# Patient Record
Sex: MALE | Race: WHITE | Employment: OTHER | URBAN - METROPOLITAN AREA
[De-identification: names, ages, dates, MRNs, and addresses within clinical notes are randomized per-mention and may not be internally consistent; named-entity substitution may affect disease eponyms.]

---

## 2017-01-26 ENCOUNTER — TELEPHONE (OUTPATIENT)
Dept: INTERNAL MEDICINE CLINIC | Age: 82
End: 2017-01-26

## 2017-05-25 ENCOUNTER — OFFICE VISIT (OUTPATIENT)
Dept: INTERNAL MEDICINE CLINIC | Age: 82
End: 2017-05-25

## 2017-05-25 VITALS
HEIGHT: 72 IN | HEART RATE: 65 BPM | OXYGEN SATURATION: 97 % | DIASTOLIC BLOOD PRESSURE: 70 MMHG | TEMPERATURE: 98.8 F | SYSTOLIC BLOOD PRESSURE: 136 MMHG | WEIGHT: 208 LBS | RESPIRATION RATE: 14 BRPM | BODY MASS INDEX: 28.17 KG/M2

## 2017-05-25 DIAGNOSIS — I48.20 CHRONIC ATRIAL FIBRILLATION (HCC): ICD-10-CM

## 2017-05-25 DIAGNOSIS — I10 ESSENTIAL HYPERTENSION: Primary | ICD-10-CM

## 2017-05-25 DIAGNOSIS — Z80.0 FH: COLON CANCER: ICD-10-CM

## 2017-05-25 DIAGNOSIS — R60.9 EDEMA, UNSPECIFIED TYPE: ICD-10-CM

## 2017-05-25 PROCEDURE — 99214 OFFICE O/P EST MOD 30 MIN: CPT | Performed by: INTERNAL MEDICINE

## 2017-05-25 RX ORDER — FUROSEMIDE 40 MG/1
40 TABLET ORAL DAILY
Qty: 90 TABLET | Refills: 1 | Status: SHIPPED | OUTPATIENT
Start: 2017-05-25 | End: 2017-06-06 | Stop reason: SDUPTHER

## 2017-05-25 ASSESSMENT — ENCOUNTER SYMPTOMS
VOICE CHANGE: 0
SHORTNESS OF BREATH: 0
WHEEZING: 0
TROUBLE SWALLOWING: 0
ABDOMINAL PAIN: 0
CONSTIPATION: 0

## 2017-06-06 RX ORDER — FUROSEMIDE 40 MG/1
40 TABLET ORAL DAILY
Qty: 90 TABLET | Refills: 1 | Status: SHIPPED | OUTPATIENT
Start: 2017-06-06 | End: 2017-11-27 | Stop reason: SDUPTHER

## 2017-06-13 ENCOUNTER — TELEPHONE (OUTPATIENT)
Dept: INTERNAL MEDICINE CLINIC | Age: 82
End: 2017-06-13

## 2017-07-25 ENCOUNTER — OFFICE VISIT (OUTPATIENT)
Dept: INTERNAL MEDICINE CLINIC | Age: 82
End: 2017-07-25

## 2017-07-25 VITALS
SYSTOLIC BLOOD PRESSURE: 118 MMHG | HEART RATE: 60 BPM | OXYGEN SATURATION: 97 % | HEIGHT: 72 IN | BODY MASS INDEX: 27.77 KG/M2 | DIASTOLIC BLOOD PRESSURE: 64 MMHG | WEIGHT: 205 LBS

## 2017-07-25 DIAGNOSIS — E53.8 B12 DEFICIENCY: ICD-10-CM

## 2017-07-25 DIAGNOSIS — R73.9 HYPERGLYCEMIA: Primary | ICD-10-CM

## 2017-07-25 DIAGNOSIS — I25.10 CORONARY ARTERY DISEASE INVOLVING NATIVE HEART WITHOUT ANGINA PECTORIS, UNSPECIFIED VESSEL OR LESION TYPE: ICD-10-CM

## 2017-07-25 DIAGNOSIS — I10 ESSENTIAL HYPERTENSION: Primary | ICD-10-CM

## 2017-07-25 DIAGNOSIS — Z80.0 FH: COLON CANCER: ICD-10-CM

## 2017-07-25 DIAGNOSIS — I48.20 CHRONIC ATRIAL FIBRILLATION (HCC): ICD-10-CM

## 2017-07-25 PROCEDURE — 99214 OFFICE O/P EST MOD 30 MIN: CPT | Performed by: INTERNAL MEDICINE

## 2017-07-25 RX ORDER — POTASSIUM CHLORIDE 20 MEQ/1
20 TABLET, EXTENDED RELEASE ORAL DAILY
Qty: 90 TABLET | Refills: 1 | Status: SHIPPED | OUTPATIENT
Start: 2017-07-25 | End: 2017-11-27 | Stop reason: SDUPTHER

## 2017-07-25 RX ORDER — WARFARIN SODIUM 5 MG/1
7.5 TABLET ORAL DAILY
Qty: 135 TABLET | Refills: 1 | Status: SHIPPED | OUTPATIENT
Start: 2017-07-25 | End: 2017-11-27 | Stop reason: SDUPTHER

## 2017-07-25 ASSESSMENT — ENCOUNTER SYMPTOMS
DIARRHEA: 0
SHORTNESS OF BREATH: 0
WHEEZING: 0
ABDOMINAL PAIN: 0
CONSTIPATION: 1
COUGH: 0
SORE THROAT: 0

## 2017-07-28 LAB
A/G RATIO: 1.4 (ref 1.1–2.2)
ALBUMIN SERPL-MCNC: 4.2 G/DL (ref 3.4–5)
ALP BLD-CCNC: 66 U/L (ref 40–129)
ALT SERPL-CCNC: 17 U/L (ref 10–40)
ANION GAP SERPL CALCULATED.3IONS-SCNC: 15 MMOL/L (ref 3–16)
AST SERPL-CCNC: 27 U/L (ref 15–37)
BILIRUB SERPL-MCNC: 0.9 MG/DL (ref 0–1)
BUN BLDV-MCNC: 25 MG/DL (ref 7–20)
CALCIUM SERPL-MCNC: 9.8 MG/DL (ref 8.3–10.6)
CHLORIDE BLD-SCNC: 100 MMOL/L (ref 99–110)
CHOLESTEROL, TOTAL: 187 MG/DL (ref 0–199)
CO2: 29 MMOL/L (ref 21–32)
CREAT SERPL-MCNC: 1.2 MG/DL (ref 0.8–1.3)
GFR AFRICAN AMERICAN: >60
GFR NON-AFRICAN AMERICAN: 57
GLOBULIN: 2.9 G/DL
GLUCOSE BLD-MCNC: 116 MG/DL (ref 70–99)
HDLC SERPL-MCNC: 46 MG/DL (ref 40–60)
LDL CHOLESTEROL CALCULATED: 99 MG/DL
POTASSIUM SERPL-SCNC: 4.3 MMOL/L (ref 3.5–5.1)
SODIUM BLD-SCNC: 144 MMOL/L (ref 136–145)
TOTAL PROTEIN: 7.1 G/DL (ref 6.4–8.2)
TRIGL SERPL-MCNC: 212 MG/DL (ref 0–150)
VLDLC SERPL CALC-MCNC: 42 MG/DL

## 2017-07-29 LAB
ESTIMATED AVERAGE GLUCOSE: 134.1 MG/DL
HBA1C MFR BLD: 6.3 %
VITAMIN B-12: 1053 PG/ML (ref 211–911)

## 2017-08-16 ENCOUNTER — OFFICE VISIT (OUTPATIENT)
Dept: INTERNAL MEDICINE CLINIC | Age: 82
End: 2017-08-16

## 2017-08-16 VITALS
HEART RATE: 66 BPM | DIASTOLIC BLOOD PRESSURE: 72 MMHG | BODY MASS INDEX: 28.17 KG/M2 | OXYGEN SATURATION: 95 % | RESPIRATION RATE: 14 BRPM | WEIGHT: 208 LBS | HEIGHT: 72 IN | TEMPERATURE: 98.1 F | SYSTOLIC BLOOD PRESSURE: 138 MMHG

## 2017-08-16 DIAGNOSIS — I48.20 CHRONIC ATRIAL FIBRILLATION (HCC): ICD-10-CM

## 2017-08-16 DIAGNOSIS — I10 ESSENTIAL HYPERTENSION: ICD-10-CM

## 2017-08-16 DIAGNOSIS — G45.3 AMAUROSIS FUGAX OF LEFT EYE: Primary | ICD-10-CM

## 2017-08-16 PROCEDURE — 99214 OFFICE O/P EST MOD 30 MIN: CPT | Performed by: INTERNAL MEDICINE

## 2017-08-16 RX ORDER — HYDROCORTISONE ACETATE 0.5 %
CREAM (GRAM) TOPICAL
Refills: 0 | COMMUNITY
Start: 2017-08-16 | End: 2019-10-29 | Stop reason: ALTCHOICE

## 2017-08-16 ASSESSMENT — ENCOUNTER SYMPTOMS
VOMITING: 0
EYE PAIN: 0
NAUSEA: 0
COUGH: 0
SHORTNESS OF BREATH: 0

## 2017-08-25 ENCOUNTER — HOSPITAL ENCOUNTER (OUTPATIENT)
Dept: NON INVASIVE DIAGNOSTICS | Age: 82
Discharge: HOME | End: 2017-08-25
Attending: INTERNAL MEDICINE | Admitting: INTERNAL MEDICINE

## 2017-08-25 ENCOUNTER — HOSPITAL ENCOUNTER (OUTPATIENT)
Dept: VASCULAR LAB | Age: 82
Discharge: HOME | End: 2017-08-25
Attending: INTERNAL MEDICINE | Admitting: INTERNAL MEDICINE

## 2017-08-25 DIAGNOSIS — G45.3 AMAUROSIS FUGAX: ICD-10-CM

## 2017-08-25 LAB
LEFT VENTRICULAR EJECTION FRACTION HIGH VALUE: 60 %
LEFT VENTRICULAR EJECTION FRACTION MODE: NORMAL
LV EF: 60 %
LVEF MODALITY: NORMAL

## 2017-08-29 ENCOUNTER — TELEPHONE (OUTPATIENT)
Dept: INTERNAL MEDICINE CLINIC | Age: 82
End: 2017-08-29

## 2017-09-06 ENCOUNTER — TELEPHONE (OUTPATIENT)
Dept: INTERNAL MEDICINE CLINIC | Age: 82
End: 2017-09-06

## 2017-09-08 ENCOUNTER — TELEPHONE (OUTPATIENT)
Dept: INTERNAL MEDICINE CLINIC | Age: 82
End: 2017-09-08

## 2017-09-08 NOTE — TELEPHONE ENCOUNTER
[facility] is calling about the monitor for Pt/INR.  They are asking if patient is being monitored here   [phone] [phone]

## 2017-09-08 NOTE — TELEPHONE ENCOUNTER
Attempted to reach [facility], voicemail was full  To answer the question, yes Dr. Carlos Diana is monitoring PT/INR

## 2017-10-04 ENCOUNTER — TELEPHONE (OUTPATIENT)
Dept: INTERNAL MEDICINE CLINIC | Age: 82
End: 2017-10-04

## 2017-10-16 ENCOUNTER — OFFICE VISIT (OUTPATIENT)
Dept: INTERNAL MEDICINE CLINIC | Age: 82
End: 2017-10-16

## 2017-10-16 VITALS
RESPIRATION RATE: 14 BRPM | DIASTOLIC BLOOD PRESSURE: 64 MMHG | SYSTOLIC BLOOD PRESSURE: 132 MMHG | BODY MASS INDEX: 27.93 KG/M2 | HEIGHT: 72 IN | OXYGEN SATURATION: 96 % | HEART RATE: 78 BPM | WEIGHT: 206.2 LBS | TEMPERATURE: 97.5 F

## 2017-10-16 DIAGNOSIS — E53.8 B12 DEFICIENCY: ICD-10-CM

## 2017-10-16 DIAGNOSIS — R73.9 HYPERGLYCEMIA: ICD-10-CM

## 2017-10-16 DIAGNOSIS — I48.20 CHRONIC ATRIAL FIBRILLATION (HCC): Primary | ICD-10-CM

## 2017-10-16 DIAGNOSIS — I10 ESSENTIAL HYPERTENSION: ICD-10-CM

## 2017-10-16 LAB
INR BLD: 2.48 (ref 0.85–1.15)
PROTHROMBIN TIME: 28 SEC (ref 9.6–13)

## 2017-10-16 PROCEDURE — 99214 OFFICE O/P EST MOD 30 MIN: CPT | Performed by: INTERNAL MEDICINE

## 2017-10-16 ASSESSMENT — ENCOUNTER SYMPTOMS
ABDOMINAL PAIN: 0
COUGH: 0
WHEEZING: 0
SHORTNESS OF BREATH: 0
CONSTIPATION: 0
DIARRHEA: 0

## 2017-10-24 ENCOUNTER — HOSPITAL ENCOUNTER (OUTPATIENT)
Dept: NON INVASIVE DIAGNOSTICS | Age: 82
Discharge: HOME | End: 2017-10-24
Attending: INTERNAL MEDICINE | Admitting: INTERNAL MEDICINE

## 2017-10-24 ENCOUNTER — PATIENT MESSAGE (OUTPATIENT)
Dept: INTERNAL MEDICINE CLINIC | Age: 82
End: 2017-10-24

## 2017-10-24 DIAGNOSIS — I48.20 CHRONIC ATRIAL FIBRILLATION (HCC): ICD-10-CM

## 2017-10-24 LAB
LV EF: 55 %
LVEF MODALITY: NORMAL

## 2017-10-26 ENCOUNTER — TELEPHONE (OUTPATIENT)
Dept: INTERNAL MEDICINE CLINIC | Age: 82
End: 2017-10-26

## 2017-10-26 ENCOUNTER — ANTI-COAG VISIT (OUTPATIENT)
Dept: INTERNAL MEDICINE CLINIC | Age: 82
End: 2017-10-26

## 2017-10-26 DIAGNOSIS — I48.19 PERSISTENT ATRIAL FIBRILLATION (HCC): ICD-10-CM

## 2017-10-26 LAB — INR BLD: 1.3

## 2017-10-26 NOTE — PROGRESS NOTES
[phone] (home) [phone] (work)  Pt notified of new dosage and retest date, pt repeated directions and verbalized understanding

## 2017-10-26 NOTE — TELEPHONE ENCOUNTER
INR routed as a separate message:    [phone] (home) [phone] (work)    Called patient-is wanting results of result Echo results.   Results printed and left at

## 2017-11-20 ENCOUNTER — TELEPHONE (OUTPATIENT)
Dept: INTERNAL MEDICINE CLINIC | Age: 82
End: 2017-11-20

## 2017-11-27 ENCOUNTER — OFFICE VISIT (OUTPATIENT)
Dept: INTERNAL MEDICINE CLINIC | Age: 82
End: 2017-11-27

## 2017-11-27 ENCOUNTER — ANTI-COAG VISIT (OUTPATIENT)
Dept: INTERNAL MEDICINE CLINIC | Age: 82
End: 2017-11-27

## 2017-11-27 VITALS
DIASTOLIC BLOOD PRESSURE: 72 MMHG | BODY MASS INDEX: 27.59 KG/M2 | HEART RATE: 50 BPM | TEMPERATURE: 97.5 F | WEIGHT: 203.7 LBS | HEIGHT: 72 IN | RESPIRATION RATE: 12 BRPM | OXYGEN SATURATION: 96 % | SYSTOLIC BLOOD PRESSURE: 122 MMHG

## 2017-11-27 DIAGNOSIS — R25.1 TREMOR: ICD-10-CM

## 2017-11-27 DIAGNOSIS — Z00.00 ANNUAL PHYSICAL EXAM: Primary | ICD-10-CM

## 2017-11-27 DIAGNOSIS — I10 ESSENTIAL HYPERTENSION: ICD-10-CM

## 2017-11-27 DIAGNOSIS — I48.20 CHRONIC ATRIAL FIBRILLATION (HCC): ICD-10-CM

## 2017-11-27 DIAGNOSIS — C61 PROSTATE CANCER (HCC): ICD-10-CM

## 2017-11-27 DIAGNOSIS — R73.9 HYPERGLYCEMIA: ICD-10-CM

## 2017-11-27 LAB
A/G RATIO: 1.4 (ref 1.1–2.2)
ALBUMIN SERPL-MCNC: 4.2 G/DL (ref 3.4–5)
ALP BLD-CCNC: 68 U/L (ref 40–129)
ALT SERPL-CCNC: 15 U/L (ref 10–40)
ANION GAP SERPL CALCULATED.3IONS-SCNC: 14 MMOL/L (ref 3–16)
AST SERPL-CCNC: 19 U/L (ref 15–37)
BILIRUB SERPL-MCNC: 1 MG/DL (ref 0–1)
BILIRUBIN, POC: NORMAL
BLOOD URINE, POC: NORMAL
BUN BLDV-MCNC: 44 MG/DL (ref 7–20)
CALCIUM SERPL-MCNC: 10.1 MG/DL (ref 8.3–10.6)
CHLORIDE BLD-SCNC: 99 MMOL/L (ref 99–110)
CHOLESTEROL, TOTAL: 184 MG/DL (ref 0–199)
CLARITY, POC: CLEAR
CO2: 30 MMOL/L (ref 21–32)
COLOR, POC: YELLOW
CREAT SERPL-MCNC: 1.5 MG/DL (ref 0.8–1.3)
ESTIMATED AVERAGE GLUCOSE: 139.9 MG/DL
GFR AFRICAN AMERICAN: 53
GFR NON-AFRICAN AMERICAN: 44
GLOBULIN: 3.1 G/DL
GLUCOSE BLD-MCNC: 111 MG/DL (ref 70–99)
GLUCOSE URINE, POC: NORMAL
HBA1C MFR BLD: 6.5 %
HDLC SERPL-MCNC: 49 MG/DL (ref 40–60)
INR BLD: 2.58 (ref 0.85–1.15)
KETONES, POC: NORMAL
LDL CHOLESTEROL CALCULATED: 96 MG/DL
LEUKOCYTE EST, POC: NORMAL
NITRITE, POC: NORMAL
PH, POC: 6
POTASSIUM SERPL-SCNC: 4 MMOL/L (ref 3.5–5.1)
PROTEIN, POC: NORMAL
PROTHROMBIN TIME: 29.1 SEC (ref 9.6–13)
SODIUM BLD-SCNC: 143 MMOL/L (ref 136–145)
SPECIFIC GRAVITY, POC: 1.02
TOTAL PROTEIN: 7.3 G/DL (ref 6.4–8.2)
TRIGL SERPL-MCNC: 196 MG/DL (ref 0–150)
TSH SERPL DL<=0.05 MIU/L-ACNC: 1.74 UIU/ML (ref 0.27–4.2)
UROBILINOGEN, POC: 0.2
VLDLC SERPL CALC-MCNC: 39 MG/DL

## 2017-11-27 PROCEDURE — 99397 PER PM REEVAL EST PAT 65+ YR: CPT | Performed by: INTERNAL MEDICINE

## 2017-11-27 PROCEDURE — 81002 URINALYSIS NONAUTO W/O SCOPE: CPT | Performed by: INTERNAL MEDICINE

## 2017-11-27 RX ORDER — WARFARIN SODIUM 5 MG/1
7.5 TABLET ORAL DAILY
Qty: 135 TABLET | Refills: 1 | Status: SHIPPED | OUTPATIENT
Start: 2017-11-27 | End: 2018-11-29 | Stop reason: SDUPTHER

## 2017-11-27 RX ORDER — POTASSIUM CHLORIDE 20 MEQ/1
20 TABLET, EXTENDED RELEASE ORAL DAILY
Qty: 90 TABLET | Refills: 1 | Status: SHIPPED | OUTPATIENT
Start: 2017-11-27 | End: 2018-11-29 | Stop reason: SDUPTHER

## 2017-11-27 RX ORDER — FUROSEMIDE 40 MG/1
40 TABLET ORAL DAILY
Qty: 90 TABLET | Refills: 1 | Status: SHIPPED | OUTPATIENT
Start: 2017-11-27 | End: 2018-05-29 | Stop reason: SDUPTHER

## 2017-11-27 RX ORDER — RESVERA/CHROM/GR.TEA/EGCG/DIG3 50MG-20MCG
CAPSULE ORAL
COMMUNITY
End: 2018-11-29 | Stop reason: ALTCHOICE

## 2017-11-27 ASSESSMENT — ENCOUNTER SYMPTOMS
DIARRHEA: 0
APNEA: 0
EYE PAIN: 0
TROUBLE SWALLOWING: 0
ABDOMINAL PAIN: 0
VOMITING: 0
WHEEZING: 0
COLOR CHANGE: 0
BACK PAIN: 0
ABDOMINAL DISTENTION: 0
SINUS PRESSURE: 0
COUGH: 0
CONSTIPATION: 0
CHEST TIGHTNESS: 0
SHORTNESS OF BREATH: 0
VOICE CHANGE: 0
RHINORRHEA: 0
NAUSEA: 0

## 2017-11-27 NOTE — PROGRESS NOTES
Subjective:      Patient ID: Malik Walsh is a 80 y.o. male. HPI     Chief Complaint   Patient presents with    Hyperlipidemia     Fasting     Hypertension    Annual Exam      Saw Dr Mario Alberto Hawk (), psa was undetectable 7-6-17, brought in result    Weight steady    Fasting for blood work    Feels good \"wonderful\"  occ staggers when rises, discussed, not light headed  No falls/injuries    Echocardiogram discussed    Didn't do the CT colonoscopy - laxative wasn't effective, etc        Current Outpatient Prescriptions   Medication Sig Dispense Refill    Misc Natural Products (RESVERATROL DIET) CAPS Take by mouth      Glucosamine-Chondroit-Vit C-Mn (GLUCOSAMINE 1500 COMPLEX) CAPS Take by mouth  0    Aliskiren-Hydrochlorothiazide (TEKTURNA HCT) 300-12.5 MG TABS Take 1 tablet by mouth daily 90 tablet 1    warfarin (COUMADIN) 5 MG tablet Take 1.5 tablets by mouth daily 135 tablet 1    potassium chloride (KLOR-CON M) 20 MEQ extended release tablet Take 1 tablet by mouth daily 90 tablet 1    furosemide (LASIX) 40 MG tablet Take 1 tablet by mouth daily 90 tablet 1    ketoconazole (NIZORAL) 2 % shampoo Apply topically daily as needed for Itching Apply topically daily as needed.  magnesium oxide (MAG-) 400 MG tablet Take 1 tablet by mouth 2 times daily. 180 tablet 3    esomeprazole (NEXIUM) 40 MG capsule Take 1 capsule by mouth every morning (before breakfast). 90 capsule 3    Cyanocobalamin (VITAMIN B-12) 2000 MCG TBCR Take 1 tablet by mouth daily.  betamethasone dipropionate (DIPROLENE) 0.05 % cream       Acetaminophen (TYLENOL ARTHRITIS EXT RELIEF PO) Take  by mouth.  Multiple Vitamins-Minerals (CENTRUM SILVER PO) Take 1 tablet by mouth daily.  azelastine (ASTELIN) 0.1 % nasal spray 2 sprays by Nasal route 2 times daily as needed for Rhinitis Use in each nostril as directed 1 Bottle 0     No current facility-administered medications for this visit. Allergies:   Allergies Oropharynx is clear and moist.   Eyes: Conjunctivae and EOM are normal. Pupils are equal, round, and reactive to light. Neck: Normal range of motion. Neck supple. No JVD present. No thyromegaly present. Cardiovascular: Normal rate and intact distal pulses. As/mr   irreg irreg   Pulmonary/Chest: Effort normal and breath sounds normal.   Abdominal: Soft. Bowel sounds are normal. He exhibits no distension and no mass. There is no tenderness. There is no rebound and no guarding. Musculoskeletal: He exhibits deformity (thoracic kyphosis and some cervical ankylosis). He exhibits no edema or tenderness. Reduced rom of both shoulders  S/p knee tkr   Lymphadenopathy:     He has no cervical adenopathy. He has no axillary adenopathy. Right: No inguinal adenopathy present. Left: No inguinal adenopathy present. Neurological: He is alert and oriented to person, place, and time. He has normal reflexes. Tremor head and hands   Skin: Skin is warm and dry. No rash noted. No erythema. Psychiatric: He has a normal mood and affect. His behavior is normal. Judgment and thought content normal.   Vitals reviewed. Assessment:        ICD-10-CM ICD-9-CM    1. Annual physical exam Z00.00 V70.0 POCT Urinalysis no Micro      Lipid Panel      Comprehensive Metabolic Panel      Protime-INR      Hemoglobin A1C      TSH without Reflex   2. Essential hypertension I10 401.9 POCT Urinalysis no Micro      Comprehensive Metabolic Panel      TSH without Reflex   3. Chronic atrial fibrillation (HCC) I48.2 427.31 TSH without Reflex   4. Hyperglycemia R73.9 790.29 Hemoglobin A1C   5. Tremor R25.1 781.0 TSH without Reflex   6. Prostate cancer ([facility] [address] C61 [id number]           Plan:      Annual exam - ok for age - ortho limitations are noted    htn - The current medical regimen is effective;  continue present plan and medications.      CAF - monitors at home - protime here for quality check    Glucose - check a1c    Tremor - no

## 2017-11-28 ENCOUNTER — TELEPHONE (OUTPATIENT)
Dept: INTERNAL MEDICINE CLINIC | Age: 82
End: 2017-11-28

## 2017-11-28 NOTE — TELEPHONE ENCOUNTER
Patient called to request the patch to put behind his ear for an upcoming 5 day Cruise. Would like for Rx to be called into [facility]  [phone].     Also states his wife was given a vaccine for pneumonia, and wanted to know if Dr. Josie De Leon feels he needs to get one

## 2017-12-08 ENCOUNTER — ANTI-COAG VISIT (OUTPATIENT)
Dept: INTERNAL MEDICINE CLINIC | Age: 82
End: 2017-12-08

## 2017-12-08 ENCOUNTER — TELEPHONE (OUTPATIENT)
Dept: INTERNAL MEDICINE CLINIC | Age: 82
End: 2017-12-08

## 2017-12-08 DIAGNOSIS — I48.20 CHRONIC ATRIAL FIBRILLATION (HCC): ICD-10-CM

## 2017-12-08 LAB — INR BLD: 3

## 2017-12-08 RX ORDER — SCOLOPAMINE TRANSDERMAL SYSTEM 1 MG/1
1 PATCH, EXTENDED RELEASE TRANSDERMAL
Qty: 3 PATCH | Refills: 0 | Status: SHIPPED | OUTPATIENT
Start: 2017-12-08 | End: 2018-11-29 | Stop reason: ALTCHOICE

## 2017-12-08 NOTE — TELEPHONE ENCOUNTER
This should have been sent as an anticoag note. He can continue his current dose of coumadin and should check another INR in one week, sooner if needed. I need to know how long the cruise is before I can send in a prescription. He needs to read the instructions and the precautions on the patches before using them.

## 2017-12-21 ENCOUNTER — ANTI-COAG VISIT (OUTPATIENT)
Dept: INTERNAL MEDICINE CLINIC | Age: 82
End: 2017-12-21

## 2017-12-21 ENCOUNTER — TELEPHONE (OUTPATIENT)
Dept: INTERNAL MEDICINE CLINIC | Age: 82
End: 2017-12-21

## 2017-12-21 DIAGNOSIS — I48.20 CHRONIC ATRIAL FIBRILLATION (HCC): ICD-10-CM

## 2017-12-21 LAB — INR BLD: 2.3

## 2017-12-21 NOTE — TELEPHONE ENCOUNTER
Pt states tongue did this a few weeks ago. Not much blood, just notices it here and there. He was unable to come today for appt. Scheduled him for tomorrow.

## 2017-12-26 ENCOUNTER — OFFICE VISIT (OUTPATIENT)
Dept: INTERNAL MEDICINE CLINIC | Age: 82
End: 2017-12-26

## 2017-12-26 VITALS
HEIGHT: 72 IN | OXYGEN SATURATION: 96 % | BODY MASS INDEX: 27.63 KG/M2 | HEART RATE: 68 BPM | SYSTOLIC BLOOD PRESSURE: 134 MMHG | DIASTOLIC BLOOD PRESSURE: 82 MMHG | WEIGHT: 204 LBS

## 2017-12-26 DIAGNOSIS — K14.0 GLOSSITIS: ICD-10-CM

## 2017-12-26 DIAGNOSIS — S20.211A RIB CONTUSION, RIGHT, INITIAL ENCOUNTER: Primary | ICD-10-CM

## 2017-12-26 PROCEDURE — 99214 OFFICE O/P EST MOD 30 MIN: CPT | Performed by: INTERNAL MEDICINE

## 2017-12-26 ASSESSMENT — ENCOUNTER SYMPTOMS
VOMITING: 0
ABDOMINAL PAIN: 0
NAUSEA: 0

## 2017-12-26 ASSESSMENT — PATIENT HEALTH QUESTIONNAIRE - PHQ9
1. LITTLE INTEREST OR PLEASURE IN DOING THINGS: 0
2. FEELING DOWN, DEPRESSED OR HOPELESS: 0
SUM OF ALL RESPONSES TO PHQ QUESTIONS 1-9: 0
SUM OF ALL RESPONSES TO PHQ9 QUESTIONS 1 & 2: 0

## 2017-12-26 NOTE — PROGRESS NOTES
Subjective:      Patient ID: Luis Fernando Parmar is a 80 y.o. male. HPI     Chief Complaint   Patient presents with    Other     having  bleeding  tongue     Other     fell on right  side by ribs  painful   fell last  weeks      Tripped over someone on floor, felt onto right side, hurt right chest where hit countertop, hurts with moving not with breathing  No dyspnea  5 days ago  No fever/chills  No increased cough - does hurt to cough  No sputum    Bleeding in middle of tongue, dorsum, bled a couple times  Taking a supplement for circulating, \"blood boost\" brought bottle  Ingredients reviewed, no interaction with coumadin    Review of Systems   Constitutional: Negative for appetite change, chills and fever. HENT:        See hpi   Respiratory:        See hpi   Cardiovascular: Positive for chest pain (only with palpation). Negative for leg swelling. Gastrointestinal: Negative for abdominal pain, nausea and vomiting. Endocrine: Negative for cold intolerance and heat intolerance. Musculoskeletal: Positive for arthralgias. Negative for myalgias. Hematological: Negative for adenopathy. Bruises/bleeds easily. Objective:   Physical Exam   Constitutional: He is oriented to person, place, and time. He appears well-developed and well-nourished. Neck: No tracheal deviation present. No thyromegaly present. Cardiovascular: Normal rate. irr irr   Pulmonary/Chest: Effort normal and breath sounds normal. He exhibits tenderness (right mid ax line just above bruise, no step off felt on rib). Abdominal: Soft. Bowel sounds are normal. He exhibits no distension and no mass. There is no tenderness. There is no rebound and no guarding. Musculoskeletal: He exhibits no edema. Neurological: He is alert and oriented to person, place, and time. Skin: Skin is warm and dry.    5 day old bruise right mid axillary line       Assessment:        ICD-10-CM ICD-9-CM    1. Rib contusion, right, initial encounter S20. 211A 922.1    2. Glossitis K14.0 529.0           Plan:      Rib contusion - patient leaving [facility], concerned - check cxr  Please call if symptoms worsen or do not improve.       Glossitis - no bleeding seen - no thrush - rx nystatin oral 5 ml qid side effects of the medication were discussed

## 2018-05-24 ENCOUNTER — TELEPHONE (OUTPATIENT)
Dept: INTERNAL MEDICINE CLINIC | Age: 83
End: 2018-05-24

## 2018-05-24 ENCOUNTER — ANTI-COAG VISIT (OUTPATIENT)
Dept: INTERNAL MEDICINE CLINIC | Age: 83
End: 2018-05-24

## 2018-05-24 DIAGNOSIS — I48.20 CHRONIC ATRIAL FIBRILLATION (HCC): ICD-10-CM

## 2018-05-24 LAB — INR BLD: 3.5

## 2018-05-29 ENCOUNTER — OFFICE VISIT (OUTPATIENT)
Dept: INTERNAL MEDICINE CLINIC | Age: 83
End: 2018-05-29

## 2018-05-29 ENCOUNTER — TELEPHONE (OUTPATIENT)
Dept: INTERNAL MEDICINE CLINIC | Age: 83
End: 2018-05-29

## 2018-05-29 ENCOUNTER — ANTI-COAG VISIT (OUTPATIENT)
Dept: INTERNAL MEDICINE CLINIC | Age: 83
End: 2018-05-29

## 2018-05-29 VITALS
WEIGHT: 206 LBS | HEART RATE: 64 BPM | DIASTOLIC BLOOD PRESSURE: 72 MMHG | SYSTOLIC BLOOD PRESSURE: 132 MMHG | OXYGEN SATURATION: 97 % | BODY MASS INDEX: 27.9 KG/M2 | HEIGHT: 72 IN

## 2018-05-29 DIAGNOSIS — R73.9 HYPERGLYCEMIA: ICD-10-CM

## 2018-05-29 DIAGNOSIS — I48.20 CHRONIC ATRIAL FIBRILLATION (HCC): ICD-10-CM

## 2018-05-29 DIAGNOSIS — R40.0 UNCONTROLLED DAYTIME SOMNOLENCE: Primary | ICD-10-CM

## 2018-05-29 DIAGNOSIS — E53.8 B12 DEFICIENCY: ICD-10-CM

## 2018-05-29 DIAGNOSIS — I10 ESSENTIAL HYPERTENSION: Primary | ICD-10-CM

## 2018-05-29 DIAGNOSIS — S22.41XD: ICD-10-CM

## 2018-05-29 DIAGNOSIS — I10 ESSENTIAL HYPERTENSION: ICD-10-CM

## 2018-05-29 PROBLEM — S22.49XD: Status: ACTIVE | Noted: 2018-05-29

## 2018-05-29 LAB
ANION GAP SERPL CALCULATED.3IONS-SCNC: 14 MMOL/L (ref 3–16)
BUN BLDV-MCNC: 30 MG/DL (ref 7–20)
CALCIUM SERPL-MCNC: 9.5 MG/DL (ref 8.3–10.6)
CHLORIDE BLD-SCNC: 101 MMOL/L (ref 99–110)
CO2: 29 MMOL/L (ref 21–32)
CREAT SERPL-MCNC: 1.2 MG/DL (ref 0.8–1.3)
ESTIMATED AVERAGE GLUCOSE: 137 MG/DL
GFR AFRICAN AMERICAN: >60
GFR NON-AFRICAN AMERICAN: 57
GLUCOSE BLD-MCNC: 116 MG/DL (ref 70–99)
HBA1C MFR BLD: 6.4 %
INR BLD: 3.08 (ref 0.85–1.15)
MAGNESIUM: 2 MG/DL (ref 1.8–2.4)
POTASSIUM SERPL-SCNC: 3.5 MMOL/L (ref 3.5–5.1)
PROTHROMBIN TIME: 34.8 SEC (ref 9.6–13)
SODIUM BLD-SCNC: 144 MMOL/L (ref 136–145)
VITAMIN B-12: 1161 PG/ML (ref 211–911)

## 2018-05-29 PROCEDURE — 99214 OFFICE O/P EST MOD 30 MIN: CPT | Performed by: INTERNAL MEDICINE

## 2018-05-29 RX ORDER — FUROSEMIDE 40 MG/1
40 TABLET ORAL DAILY
Qty: 90 TABLET | Refills: 1 | Status: SHIPPED | OUTPATIENT
Start: 2018-05-29 | End: 2018-11-29 | Stop reason: SDUPTHER

## 2018-05-29 RX ORDER — ESOMEPRAZOLE MAGNESIUM 40 MG/1
40 CAPSULE, DELAYED RELEASE ORAL
Qty: 90 CAPSULE | Refills: 3 | Status: SHIPPED | OUTPATIENT
Start: 2018-05-29 | End: 2018-11-29 | Stop reason: SDUPTHER

## 2018-06-25 ENCOUNTER — ANTI-COAG VISIT (OUTPATIENT)
Dept: INTERNAL MEDICINE CLINIC | Age: 83
End: 2018-06-25

## 2018-06-25 ENCOUNTER — TELEPHONE (OUTPATIENT)
Dept: INTERNAL MEDICINE CLINIC | Age: 83
End: 2018-06-25

## 2018-06-25 DIAGNOSIS — I48.20 CHRONIC ATRIAL FIBRILLATION (HCC): ICD-10-CM

## 2018-06-25 LAB — INR BLD: 3.1

## 2018-07-05 ENCOUNTER — OFFICE VISIT (OUTPATIENT)
Dept: PULMONOLOGY | Age: 83
End: 2018-07-05

## 2018-07-05 VITALS
OXYGEN SATURATION: 95 % | HEIGHT: 72 IN | HEART RATE: 63 BPM | WEIGHT: 206 LBS | DIASTOLIC BLOOD PRESSURE: 68 MMHG | BODY MASS INDEX: 27.9 KG/M2 | SYSTOLIC BLOOD PRESSURE: 127 MMHG

## 2018-07-05 DIAGNOSIS — G47.33 OBSTRUCTIVE SLEEP APNEA SYNDROME: Primary | ICD-10-CM

## 2018-07-05 DIAGNOSIS — K21.00 REFLUX ESOPHAGITIS: ICD-10-CM

## 2018-07-05 DIAGNOSIS — I10 ESSENTIAL HYPERTENSION: ICD-10-CM

## 2018-07-05 DIAGNOSIS — I25.10 CORONARY ARTERY DISEASE INVOLVING NATIVE HEART WITHOUT ANGINA PECTORIS, UNSPECIFIED VESSEL OR LESION TYPE: ICD-10-CM

## 2018-07-05 DIAGNOSIS — I48.20 CHRONIC ATRIAL FIBRILLATION (HCC): ICD-10-CM

## 2018-07-05 PROCEDURE — 99204 OFFICE O/P NEW MOD 45 MIN: CPT | Performed by: INTERNAL MEDICINE

## 2018-07-05 ASSESSMENT — ENCOUNTER SYMPTOMS
CHOKING: 0
EYE PAIN: 0
CHEST TIGHTNESS: 0
RHINORRHEA: 0
APNEA: 1
ABDOMINAL DISTENTION: 0
NAUSEA: 0
VOMITING: 0
PHOTOPHOBIA: 0
SHORTNESS OF BREATH: 0
ABDOMINAL PAIN: 0
ALLERGIC/IMMUNOLOGIC NEGATIVE: 1

## 2018-07-05 ASSESSMENT — SLEEP AND FATIGUE QUESTIONNAIRES
HOW LIKELY ARE YOU TO NOD OFF OR FALL ASLEEP WHILE WATCHING TV: 3
HOW LIKELY ARE YOU TO NOD OFF OR FALL ASLEEP IN A CAR, WHILE STOPPED FOR A FEW MINUTES IN TRAFFIC: 3
HOW LIKELY ARE YOU TO NOD OFF OR FALL ASLEEP WHILE SITTING INACTIVE IN A PUBLIC PLACE: 3
NECK CIRCUMFERENCE (INCHES): 16
ESS TOTAL SCORE: 19
HOW LIKELY ARE YOU TO NOD OFF OR FALL ASLEEP WHILE SITTING QUIETLY AFTER LUNCH WITHOUT ALCOHOL: 2
HOW LIKELY ARE YOU TO NOD OFF OR FALL ASLEEP WHEN YOU ARE A PASSENGER IN A CAR FOR AN HOUR WITHOUT A BREAK: 2
HOW LIKELY ARE YOU TO NOD OFF OR FALL ASLEEP WHILE SITTING AND READING: 2
HOW LIKELY ARE YOU TO NOD OFF OR FALL ASLEEP WHILE LYING DOWN TO REST IN THE AFTERNOON WHEN CIRCUMSTANCES PERMIT: 3
HOW LIKELY ARE YOU TO NOD OFF OR FALL ASLEEP WHILE SITTING AND TALKING TO SOMEONE: 1

## 2018-07-05 NOTE — PROGRESS NOTES
Alcohol use No    Drug use: No    Sexual activity: Yes     Partners: Female     Other Topics Concern    Not on file     Social History Narrative    No narrative on file       Current Outpatient Prescriptions   Medication Sig Dispense Refill    esomeprazole ([address]) 40 MG delayed release capsule Take 1 capsule by mouth every morning (before breakfast) 90 capsule 3    furosemide (LASIX) 40 MG tablet Take 1 tablet by mouth daily 90 tablet 1    Aliskiren-Hydrochlorothiazide (TEKTURNA HCT) 300-12.5 MG TABS Take 1 tablet by mouth daily 90 tablet 1    nystatin (MYCOSTATIN) 456841 UNIT/ML suspension Take 5 mLs by mouth 4 times daily 60 mL 0    scopolamine (TRANSDERM-SCOP, 1.5 MG,) transdermal patch Place 1 patch onto the skin every 72 hours 3 patch 0    Misc Natural Products (RESVERATROL DIET) CAPS Take by mouth      warfarin (COUMADIN) 5 MG tablet Take 1.5 tablets by mouth daily 135 tablet 1    potassium chloride (KLOR-CON M) 20 MEQ extended release tablet Take 1 tablet by mouth daily 90 tablet 1    Glucosamine-Chondroit-Vit C-Mn (GLUCOSAMINE 1500 COMPLEX) CAPS Take by mouth  0    ketoconazole (NIZORAL) 2 % shampoo Apply topically daily as needed for Itching Apply topically daily as needed.  magnesium oxide (MAG-) 400 MG tablet Take 1 tablet by mouth 2 times daily. 180 tablet 3    Cyanocobalamin (VITAMIN B-12) 2000 MCG TBCR Take 1 tablet by mouth daily.  betamethasone dipropionate (DIPROLENE) 0.05 % cream       Acetaminophen (TYLENOL ARTHRITIS EXT RELIEF PO) Take  by mouth.  Multiple Vitamins-Minerals (CENTRUM SILVER PO) Take 1 tablet by mouth daily.  azelastine (ASTELIN) 0.1 % nasal spray 2 sprays by Nasal route 2 times daily as needed for Rhinitis Use in each nostril as directed 1 Bottle 0     No current facility-administered medications for this visit.         Allergies as of 07/05/2018 - Review Complete 07/05/2018   Allergen Reaction Noted    Latex  07/09/2013    Fenofibrate Relation Age of Onset    Cancer Mother         breast    Heart Disease Father     Cancer Maternal Aunt         breast    Cancer Maternal Grandmother     Other Sister         alzheimer's    Cancer Brother         prostate, colon    Heart Disease Brother     Stroke Brother         blood clots    Cancer Brother         colon       Review of Systems   Constitutional: Positive for fatigue. Negative for activity change and appetite change. HENT: Positive for congestion. Negative for nosebleeds, postnasal drip, rhinorrhea and sneezing. Eyes: Negative for photophobia, pain and visual disturbance. Respiratory: Positive for apnea. Negative for choking, chest tightness and shortness of breath. Cardiovascular: Negative. Gastrointestinal: Negative for abdominal distention, abdominal pain, nausea and vomiting. Endocrine: Negative for cold intolerance and heat intolerance. Genitourinary: Negative for difficulty urinating, dysuria, frequency and urgency. Musculoskeletal: Negative. Negative for neck pain and neck stiffness. Skin: Negative. Allergic/Immunologic: Negative. Neurological: Positive for tremors. Negative for seizures, syncope and weakness. Hematological: Negative for adenopathy. Does not bruise/bleed easily. Psychiatric/Behavioral: Positive for decreased concentration and sleep disturbance. Negative for agitation, behavioral problems and confusion. Objective:     Vitals:  Weight BMI   Wt Readings from Last 3 Encounters:   07/05/18 206 lb (93.4 kg)   05/29/18 206 lb (93.4 kg)   12/26/17 204 lb (92.5 kg)    Body mass index is 27.94 kg/m².      BP HR SaO2   BP Readings from Last 3 Encounters:   07/05/18 127/68   05/29/18 132/72   12/26/17 134/82    Pulse Readings from Last 3 Encounters:   07/05/18 63   05/29/18 64   12/26/17 68    SpO2 Readings from Last 3 Encounters:   07/05/18 95%   05/29/18 97%   12/26/17 96%        Physical Exam   Constitutional: He is oriented to person,

## 2018-07-13 ENCOUNTER — TELEPHONE (OUTPATIENT)
Dept: INTERNAL MEDICINE CLINIC | Age: 83
End: 2018-07-13

## 2018-07-13 ENCOUNTER — ANTI-COAG VISIT (OUTPATIENT)
Dept: INTERNAL MEDICINE CLINIC | Age: 83
End: 2018-07-13

## 2018-07-13 LAB — INR BLD: 1.3

## 2018-07-14 NOTE — PROGRESS NOTES
Spoke with patient. INR is not at goal. He takes Coumadin 5mg daily except 7.5mg on Saturdays. Told patient to take Coumadin extra 1/2 tablet  (7.5mg) on this Oscar 7/15/18 also and recheck the protime/INR on 7/19/18.

## 2018-07-30 ENCOUNTER — ANTI-COAG VISIT (OUTPATIENT)
Dept: INTERNAL MEDICINE CLINIC | Age: 83
End: 2018-07-30

## 2018-07-30 ENCOUNTER — TELEPHONE (OUTPATIENT)
Dept: INTERNAL MEDICINE CLINIC | Age: 83
End: 2018-07-30

## 2018-07-30 DIAGNOSIS — I48.20 CHRONIC ATRIAL FIBRILLATION (HCC): ICD-10-CM

## 2018-07-30 LAB — INR BLD: 2

## 2018-08-09 ENCOUNTER — HOSPITAL ENCOUNTER (OUTPATIENT)
Dept: SLEEP MEDICINE | Age: 83
Discharge: HOME | End: 2018-08-11
Attending: INTERNAL MEDICINE | Admitting: INTERNAL MEDICINE

## 2018-08-09 ENCOUNTER — TELEPHONE (OUTPATIENT)
Dept: INTERNAL MEDICINE CLINIC | Age: 83
End: 2018-08-09

## 2018-08-09 DIAGNOSIS — G47.33 OBSTRUCTIVE SLEEP APNEA SYNDROME: ICD-10-CM

## 2018-08-09 PROCEDURE — 95811 POLYSOM 6/>YRS CPAP 4/> PARM: CPT | Performed by: INTERNAL MEDICINE

## 2018-08-14 ENCOUNTER — TELEPHONE (OUTPATIENT)
Dept: SLEEP MEDICINE | Age: 83
End: 2018-08-14

## 2018-08-15 ENCOUNTER — TELEPHONE (OUTPATIENT)
Dept: SLEEP MEDICINE | Age: 83
End: 2018-08-15

## 2018-08-15 NOTE — TELEPHONE ENCOUNTER
Spoke with pt to order unit. Pt asking appropriate questions. Unit to be ordered from [address]. F/U scheduled and pt was asked to bring unit.

## 2018-08-23 ENCOUNTER — TELEPHONE (OUTPATIENT)
Dept: INTERNAL MEDICINE CLINIC | Age: 83
End: 2018-08-23

## 2018-08-23 NOTE — TELEPHONE ENCOUNTER
Pt called in stating that his INR at a 2.2 & he is out of test strips. Unable to schedule the pt at the time of the call but pt was unsure what he needed to do.   Please contact pt as soon as possible at [phone]

## 2018-08-31 ENCOUNTER — TELEPHONE (OUTPATIENT)
Dept: INTERNAL MEDICINE CLINIC | Age: 83
End: 2018-08-31

## 2018-08-31 ENCOUNTER — ANTI-COAG VISIT (OUTPATIENT)
Dept: INTERNAL MEDICINE CLINIC | Age: 83
End: 2018-08-31

## 2018-08-31 DIAGNOSIS — I48.91 ATRIAL FIBRILLATION, UNSPECIFIED TYPE (HCC): ICD-10-CM

## 2018-08-31 LAB
INR BLD: 2.6
PROTIME: NORMAL SECONDS

## 2018-09-17 ENCOUNTER — TELEPHONE (OUTPATIENT)
Dept: INTERNAL MEDICINE CLINIC | Age: 83
End: 2018-09-17

## 2018-09-17 ENCOUNTER — ANTI-COAG VISIT (OUTPATIENT)
Dept: INTERNAL MEDICINE CLINIC | Age: 83
End: 2018-09-17

## 2018-09-17 DIAGNOSIS — I48.20 CHRONIC ATRIAL FIBRILLATION (HCC): ICD-10-CM

## 2018-09-17 LAB — INR BLD: 2.1

## 2018-10-03 ENCOUNTER — TELEPHONE (OUTPATIENT)
Dept: PULMONOLOGY | Age: 83
End: 2018-10-03

## 2018-10-17 ENCOUNTER — TELEPHONE (OUTPATIENT)
Dept: PULMONOLOGY | Age: 83
End: 2018-10-17

## 2018-10-29 ENCOUNTER — TELEPHONE (OUTPATIENT)
Dept: INTERNAL MEDICINE CLINIC | Age: 83
End: 2018-10-29

## 2018-10-31 ENCOUNTER — TELEPHONE (OUTPATIENT)
Dept: FAMILY MEDICINE CLINIC | Age: 83
End: 2018-10-31

## 2018-10-31 ENCOUNTER — TELEPHONE (OUTPATIENT)
Dept: INTERNAL MEDICINE CLINIC | Age: 83
End: 2018-10-31

## 2018-10-31 ENCOUNTER — OFFICE VISIT (OUTPATIENT)
Dept: PULMONOLOGY | Age: 83
End: 2018-10-31
Payer: MEDICARE

## 2018-10-31 VITALS
HEART RATE: 58 BPM | BODY MASS INDEX: 27.5 KG/M2 | DIASTOLIC BLOOD PRESSURE: 78 MMHG | WEIGHT: 203 LBS | HEIGHT: 72 IN | SYSTOLIC BLOOD PRESSURE: 132 MMHG | OXYGEN SATURATION: 98 %

## 2018-10-31 DIAGNOSIS — K21.00 REFLUX ESOPHAGITIS: ICD-10-CM

## 2018-10-31 DIAGNOSIS — I48.20 CHRONIC ATRIAL FIBRILLATION (HCC): ICD-10-CM

## 2018-10-31 DIAGNOSIS — I10 ESSENTIAL HYPERTENSION: ICD-10-CM

## 2018-10-31 DIAGNOSIS — I25.10 CORONARY ARTERY DISEASE INVOLVING NATIVE HEART WITHOUT ANGINA PECTORIS, UNSPECIFIED VESSEL OR LESION TYPE: ICD-10-CM

## 2018-10-31 DIAGNOSIS — G47.33 OBSTRUCTIVE SLEEP APNEA SYNDROME: Primary | ICD-10-CM

## 2018-10-31 PROCEDURE — 99214 OFFICE O/P EST MOD 30 MIN: CPT | Performed by: NURSE PRACTITIONER

## 2018-10-31 ASSESSMENT — SLEEP AND FATIGUE QUESTIONNAIRES
HOW LIKELY ARE YOU TO NOD OFF OR FALL ASLEEP WHILE SITTING AND READING: 2
HOW LIKELY ARE YOU TO NOD OFF OR FALL ASLEEP WHILE SITTING AND TALKING TO SOMEONE: 1
ESS TOTAL SCORE: 19
HOW LIKELY ARE YOU TO NOD OFF OR FALL ASLEEP WHILE SITTING QUIETLY AFTER LUNCH WITHOUT ALCOHOL: 2
HOW LIKELY ARE YOU TO NOD OFF OR FALL ASLEEP WHILE LYING DOWN TO REST IN THE AFTERNOON WHEN CIRCUMSTANCES PERMIT: 3
HOW LIKELY ARE YOU TO NOD OFF OR FALL ASLEEP WHILE WATCHING TV: 3
HOW LIKELY ARE YOU TO NOD OFF OR FALL ASLEEP WHILE SITTING INACTIVE IN A PUBLIC PLACE: 3
HOW LIKELY ARE YOU TO NOD OFF OR FALL ASLEEP IN A CAR, WHILE STOPPED FOR A FEW MINUTES IN TRAFFIC: 3
HOW LIKELY ARE YOU TO NOD OFF OR FALL ASLEEP WHEN YOU ARE A PASSENGER IN A CAR FOR AN HOUR WITHOUT A BREAK: 2

## 2018-10-31 ASSESSMENT — ENCOUNTER SYMPTOMS
EYE PAIN: 0
SHORTNESS OF BREATH: 0
ABDOMINAL PAIN: 0
COUGH: 0
ABDOMINAL DISTENTION: 0
APNEA: 0
SINUS PRESSURE: 0
RHINORRHEA: 0
EYE REDNESS: 0

## 2018-10-31 NOTE — PROGRESS NOTES
Historical Provider, MD reyeselastine (ASTELIN) 0.1 % nasal spray 2 sprays by Nasal route 2 times daily as needed for Rhinitis Use in each nostril as directed 10/21/15 10/20/16  Jan Blount MD       Allergies as of 10/31/2018 - Review Complete 10/31/2018   Allergen Reaction Noted    Latex  07/09/2013    Fenofibrate Anaphylaxis 07/09/2013    Aldactazide [hydrochlorothiazide w-spironolactone] Other (See Comments) 07/09/2013    Bacitracin Other (See Comments) 12/22/2015    Bethanechol Hives 07/09/2013    Cephalosporins  07/09/2013    Codeine Hives 07/09/2013    Darvon [fd&c red #40-fd&c yellow #10-propoxyphene]  07/09/2013    Diovan [valsartan]  07/09/2013    Flexeril [cyclobenzaprine] Other (See Comments) 07/09/2013    Glycerol, iodinated  07/23/2013    Grifulvin v [griseofulvin]  07/09/2013    Levofloxacin  07/09/2013    Midazolam hcl  07/09/2013    Spironolactone  07/09/2013    Steradiol [depo-testadiol] Other (See Comments) 07/09/2013    Zestril [lisinopril]  07/09/2013    Zestril [lisinopril] Other (See Comments) 07/09/2013    Ciprofloxacin Rash 07/09/2013    Demerol hcl [meperidine]  07/09/2013    Doxazosin  07/09/2013    Penicillins Rash 07/09/2013       Patient Active Problem List   Diagnosis    CAD (coronary artery disease)    CAD (coronary artery disease)    Arthritis    Prostate cancer ([facility] [address]    Tremor    Essential hypertension    Reflux esophagitis    Atrial fibrillation (HCC)    Edema    Venous insufficiency of leg    Folliculitis    C28 deficiency    Hyperglycemia    Eyelid abnormality    Leg weakness, bilateral    Fatigue    Bradycardia    Sleep apnea    Balance problem    FH: colon cancer    Traumatic closed nondisplaced fracture of two ribs with routine healing       Past Medical History:   Diagnosis Date    Arthritis     Atrial fibrillation ([facility] [address]     CAD (coronary artery disease)     [facility]    prostate    Hypertension     Sleep apnea 5/11/2015       Past Surgical History:   Procedure Laterality Date    BLADDER SURGERY  12-    CATARACT REMOVAL  2012    both eyes    GALLBLADDER SURGERY  11-    HERNIA REPAIR  2001    KNEE SURGERY  12-    knee replacement  left knee    PROSTATECTOMY  1999       Family History   Problem Relation Age of Onset    Cancer Mother         breast    Heart Disease Father     Cancer Maternal Aunt         breast    Cancer Maternal Grandmother     Other Sister         alzheimer's    Cancer Brother         prostate, colon    Heart Disease Brother     Stroke Brother         blood clots    Cancer Brother         colon       Vitals:  Weight BMI   Wt Readings from Last 3 Encounters:   10/31/18 203 lb (92.1 kg)   07/05/18 206 lb (93.4 kg)   05/29/18 206 lb (93.4 kg)    Body mass index is 27.53 kg/m². BP HR SaO2   BP Readings from Last 3 Encounters:   10/31/18 132/78   07/05/18 127/68   05/29/18 132/72    Pulse Readings from Last 3 Encounters:   10/31/18 58   07/05/18 63   05/29/18 64    SpO2 Readings from Last 3 Encounters:   10/31/18 98%   07/05/18 95%   05/29/18 97%        Assessment/Plan:     Atrial fibrillation (HCC)  Chronic- Stable. Cont meds per PCP and other physicians. Reflux esophagitis  Chronic- Stable. Cont meds per PCP and other physicians. Essential hypertension  Chronic- Stable. Cont meds per PCP and other physicians. CAD (coronary artery disease)  Chronic- Stable. Cont meds per PCP and other physicians. Sleep apnea  Chronic: Needing to return to using the machine. Reviewed compliance download with pt. Supplies and parts as needed for his machine. These are medically necessary. Continue medications per his PCP and other physicians. Limit caffeine use after 3pm.  Encouraged him to work on weight loss through diet and exercise.   Diagnoses of Chronic atrial fibrillation (HCC), Reflux esophagitis, Essential hypertension, and Coronary artery disease

## 2018-11-12 DIAGNOSIS — I48.20 CHRONIC ATRIAL FIBRILLATION (HCC): Primary | ICD-10-CM

## 2018-11-12 LAB
INR BLD: 1.18 (ref 0.86–1.14)
PROTHROMBIN TIME: 13.5 SEC (ref 9.8–13)

## 2018-11-13 ENCOUNTER — ANTI-COAG VISIT (OUTPATIENT)
Dept: INTERNAL MEDICINE CLINIC | Age: 83
End: 2018-11-13

## 2018-11-13 DIAGNOSIS — I48.20 CHRONIC ATRIAL FIBRILLATION (HCC): ICD-10-CM

## 2018-11-19 ENCOUNTER — TELEPHONE (OUTPATIENT)
Dept: INTERNAL MEDICINE CLINIC | Age: 83
End: 2018-11-19

## 2018-11-19 NOTE — TELEPHONE ENCOUNTER
Patient calling sts he had blood work- done on 11.12.18 INR -haven't not got any results yet -patient  Needing to know the dose he should be on-  Patient was off the warfarin  for 5 days b/c of  Root canal  -patient c/b number    [phone] please call patient with direction

## 2018-11-19 NOTE — TELEPHONE ENCOUNTER
Called patient and talked with his wife. They are having problems with the home machine. She states that the 11/12/18 test was taken at the office here. She ask that we call back to him directions on the dosage of his med  OR  Should he come back up to the office and get another PT/INR drawn.

## 2018-11-20 ENCOUNTER — ANTI-COAG VISIT (OUTPATIENT)
Dept: INTERNAL MEDICINE CLINIC | Age: 83
End: 2018-11-20

## 2018-11-20 DIAGNOSIS — I48.20 CHRONIC ATRIAL FIBRILLATION (HCC): ICD-10-CM

## 2018-11-20 LAB
INR BLD: 2.04 (ref 0.86–1.14)
PROTHROMBIN TIME: 23.3 SEC (ref 9.8–13)

## 2018-11-29 ENCOUNTER — TELEPHONE (OUTPATIENT)
Dept: INTERNAL MEDICINE CLINIC | Age: 83
End: 2018-11-29

## 2018-11-29 ENCOUNTER — OFFICE VISIT (OUTPATIENT)
Dept: INTERNAL MEDICINE CLINIC | Age: 83
End: 2018-11-29
Payer: MEDICARE

## 2018-11-29 VITALS
DIASTOLIC BLOOD PRESSURE: 68 MMHG | WEIGHT: 201.2 LBS | SYSTOLIC BLOOD PRESSURE: 128 MMHG | HEART RATE: 65 BPM | TEMPERATURE: 97.5 F | BODY MASS INDEX: 27.29 KG/M2 | RESPIRATION RATE: 20 BRPM | OXYGEN SATURATION: 97 %

## 2018-11-29 DIAGNOSIS — I25.10 CORONARY ARTERY DISEASE INVOLVING NATIVE CORONARY ARTERY OF NATIVE HEART WITHOUT ANGINA PECTORIS: ICD-10-CM

## 2018-11-29 DIAGNOSIS — R73.9 HYPERGLYCEMIA: ICD-10-CM

## 2018-11-29 DIAGNOSIS — C61 PROSTATE CANCER (HCC): ICD-10-CM

## 2018-11-29 DIAGNOSIS — Z00.00 ANNUAL PHYSICAL EXAM: ICD-10-CM

## 2018-11-29 DIAGNOSIS — M15.9 PRIMARY OSTEOARTHRITIS INVOLVING MULTIPLE JOINTS: ICD-10-CM

## 2018-11-29 DIAGNOSIS — I48.20 CHRONIC ATRIAL FIBRILLATION (HCC): ICD-10-CM

## 2018-11-29 DIAGNOSIS — I10 ESSENTIAL HYPERTENSION: ICD-10-CM

## 2018-11-29 DIAGNOSIS — E53.8 B12 DEFICIENCY: ICD-10-CM

## 2018-11-29 DIAGNOSIS — Z00.00 ANNUAL PHYSICAL EXAM: Primary | ICD-10-CM

## 2018-11-29 LAB
A/G RATIO: 1.4 (ref 1.1–2.2)
ALBUMIN SERPL-MCNC: 4.1 G/DL (ref 3.4–5)
ALP BLD-CCNC: 75 U/L (ref 40–129)
ALT SERPL-CCNC: 11 U/L (ref 10–40)
ANION GAP SERPL CALCULATED.3IONS-SCNC: 14 MMOL/L (ref 3–16)
AST SERPL-CCNC: 18 U/L (ref 15–37)
BASOPHILS ABSOLUTE: 0 K/UL (ref 0–0.2)
BASOPHILS RELATIVE PERCENT: 0.9 %
BILIRUB SERPL-MCNC: 0.9 MG/DL (ref 0–1)
BUN BLDV-MCNC: 25 MG/DL (ref 7–20)
CALCIUM SERPL-MCNC: 9.9 MG/DL (ref 8.3–10.6)
CHLORIDE BLD-SCNC: 100 MMOL/L (ref 99–110)
CHOLESTEROL, TOTAL: 161 MG/DL (ref 0–199)
CO2: 28 MMOL/L (ref 21–32)
CREAT SERPL-MCNC: 1.1 MG/DL (ref 0.8–1.3)
EOSINOPHILS ABSOLUTE: 0.1 K/UL (ref 0–0.6)
EOSINOPHILS RELATIVE PERCENT: 2.5 %
GFR AFRICAN AMERICAN: >60
GFR NON-AFRICAN AMERICAN: >60
GLOBULIN: 3 G/DL
GLUCOSE BLD-MCNC: 106 MG/DL (ref 70–99)
HCT VFR BLD CALC: 43 % (ref 40.5–52.5)
HDLC SERPL-MCNC: 43 MG/DL (ref 40–60)
HEMOGLOBIN: 14.2 G/DL (ref 13.5–17.5)
LDL CHOLESTEROL CALCULATED: 84 MG/DL
LYMPHOCYTES ABSOLUTE: 2.1 K/UL (ref 1–5.1)
LYMPHOCYTES RELATIVE PERCENT: 39.7 %
MCH RBC QN AUTO: 30.7 PG (ref 26–34)
MCHC RBC AUTO-ENTMCNC: 33.1 G/DL (ref 31–36)
MCV RBC AUTO: 92.8 FL (ref 80–100)
MONOCYTES ABSOLUTE: 0.5 K/UL (ref 0–1.3)
MONOCYTES RELATIVE PERCENT: 9.9 %
NEUTROPHILS ABSOLUTE: 2.5 K/UL (ref 1.7–7.7)
NEUTROPHILS RELATIVE PERCENT: 47 %
PDW BLD-RTO: 14.2 % (ref 12.4–15.4)
PLATELET # BLD: 302 K/UL (ref 135–450)
PMV BLD AUTO: 9.3 FL (ref 5–10.5)
POTASSIUM SERPL-SCNC: 3.9 MMOL/L (ref 3.5–5.1)
PROSTATE SPECIFIC ANTIGEN: <0.01 NG/ML (ref 0–4)
RBC # BLD: 4.64 M/UL (ref 4.2–5.9)
SODIUM BLD-SCNC: 142 MMOL/L (ref 136–145)
TOTAL PROTEIN: 7.1 G/DL (ref 6.4–8.2)
TRIGL SERPL-MCNC: 172 MG/DL (ref 0–150)
TSH SERPL DL<=0.05 MIU/L-ACNC: 2.57 UIU/ML (ref 0.27–4.2)
VITAMIN B-12: >2000 PG/ML (ref 211–911)
VLDLC SERPL CALC-MCNC: 34 MG/DL
WBC # BLD: 5.4 K/UL (ref 4–11)

## 2018-11-29 PROCEDURE — 99397 PER PM REEVAL EST PAT 65+ YR: CPT | Performed by: INTERNAL MEDICINE

## 2018-11-29 RX ORDER — ESOMEPRAZOLE MAGNESIUM 40 MG/1
40 CAPSULE, DELAYED RELEASE ORAL
Qty: 90 CAPSULE | Refills: 3 | Status: SHIPPED | OUTPATIENT
Start: 2018-11-29

## 2018-11-29 RX ORDER — WARFARIN SODIUM 5 MG/1
7.5 TABLET ORAL DAILY
Qty: 135 TABLET | Refills: 3 | Status: SHIPPED | OUTPATIENT
Start: 2018-11-29 | End: 2019-10-29

## 2018-11-29 RX ORDER — KETOCONAZOLE 20 MG/ML
SHAMPOO TOPICAL
Qty: 360 ML | Refills: 1 | Status: SHIPPED | OUTPATIENT
Start: 2018-11-29

## 2018-11-29 RX ORDER — FUROSEMIDE 40 MG/1
40 TABLET ORAL DAILY
Qty: 90 TABLET | Refills: 3 | Status: SHIPPED | OUTPATIENT
Start: 2018-11-29 | End: 2019-10-29

## 2018-11-29 RX ORDER — POTASSIUM CHLORIDE 20 MEQ/1
20 TABLET, EXTENDED RELEASE ORAL DAILY
Qty: 90 TABLET | Refills: 3 | Status: SHIPPED | OUTPATIENT
Start: 2018-11-29 | End: 2019-12-02 | Stop reason: CLARIF

## 2018-11-29 ASSESSMENT — ENCOUNTER SYMPTOMS
SINUS PRESSURE: 0
WHEEZING: 0
DIARRHEA: 1
ABDOMINAL DISTENTION: 0
COLOR CHANGE: 0
CHEST TIGHTNESS: 0
BACK PAIN: 0
TROUBLE SWALLOWING: 0
COUGH: 0
VOICE CHANGE: 0
RHINORRHEA: 0
EYE PAIN: 0
CONSTIPATION: 0
APNEA: 0
VOMITING: 0
NAUSEA: 0
ABDOMINAL PAIN: 0
SHORTNESS OF BREATH: 0

## 2018-11-29 NOTE — PROGRESS NOTES
Cancer Brother         colon         Review of Systems   Constitutional: Negative for activity change, appetite change, chills, fatigue, fever and unexpected weight change. HENT: Positive for hearing loss (has hearing aids). Negative for dental problem, rhinorrhea, sinus pressure, tinnitus, trouble swallowing and voice change. Eyes: Negative for pain and visual disturbance. Last eye exam within the last year   Respiratory: Negative for apnea, cough, chest tightness, shortness of breath and wheezing. Cardiovascular: Negative for chest pain, palpitations and leg swelling. Gastrointestinal: Positive for diarrhea. Negative for abdominal distention, abdominal pain, constipation, nausea and vomiting. No heartburn   Genitourinary: Negative for difficulty urinating, dysuria, frequency, hematuria, scrotal swelling and testicular pain. No nocturia     Musculoskeletal: Positive for arthralgias. Negative for back pain, joint swelling and myalgias. Skin: Negative for color change, pallor, rash and wound. Sees derm once a year, has a small spot on left thigh, itchy   Neurological: Positive for tremors. Negative for dizziness, syncope, speech difficulty, weakness, numbness and headaches. Hematological: Negative for adenopathy. Does not bruise/bleed easily. Psychiatric/Behavioral: Negative for dysphoric mood, sleep disturbance and suicidal ideas. The patient is not nervous/anxious. Prior to Visit Medications    Medication Sig Taking?  Authorizing Provider   esomeprazole (NEXIUM) 40 MG delayed release capsule Take 1 capsule by mouth every morning (before breakfast) Yes Joslyn Grigsby MD   furosemide (LASIX) 40 MG tablet Take 1 tablet by mouth daily Yes Joslyn Grigsby MD   Aliskiren-Hydrochlorothiazide (TEKTURNA HCT) 300-12.5 MG TABS Take 1 tablet by mouth daily Yes Joslyn Grigsby MD   warfarin (COUMADIN) 5 MG tablet Take 1.5 tablets by mouth daily Yes Joslyn Grigsby MD is effective;  continue present plan and medications. - Comprehensive Metabolic Panel; Future    8. Primary osteoarthritis involving multiple joints  Stable   Tylenol prn  Staying active      Return in about 6 months (around 5/29/2019) for hypertension, other. An electronic signature was used to authenticate this note.     --Kleber Spring MD on 11/29/2018 at 10:15 AM

## 2018-11-30 LAB
ESTIMATED AVERAGE GLUCOSE: 131.2 MG/DL
HBA1C MFR BLD: 6.2 %

## 2018-12-13 ENCOUNTER — TELEPHONE (OUTPATIENT)
Dept: INTERNAL MEDICINE CLINIC | Age: 83
End: 2018-12-13

## 2018-12-13 ENCOUNTER — OFFICE VISIT (OUTPATIENT)
Dept: PULMONOLOGY | Age: 83
End: 2018-12-13
Payer: MEDICARE

## 2018-12-13 VITALS
HEART RATE: 62 BPM | SYSTOLIC BLOOD PRESSURE: 128 MMHG | DIASTOLIC BLOOD PRESSURE: 70 MMHG | OXYGEN SATURATION: 96 % | WEIGHT: 200 LBS | HEIGHT: 72 IN | BODY MASS INDEX: 27.09 KG/M2

## 2018-12-13 DIAGNOSIS — I48.20 CHRONIC ATRIAL FIBRILLATION (HCC): Chronic | ICD-10-CM

## 2018-12-13 DIAGNOSIS — I25.10 CORONARY ARTERY DISEASE INVOLVING NATIVE HEART WITHOUT ANGINA PECTORIS, UNSPECIFIED VESSEL OR LESION TYPE: Chronic | ICD-10-CM

## 2018-12-13 DIAGNOSIS — I48.20 CHRONIC ATRIAL FIBRILLATION (HCC): Primary | Chronic | ICD-10-CM

## 2018-12-13 DIAGNOSIS — G47.33 OBSTRUCTIVE SLEEP APNEA SYNDROME: Chronic | ICD-10-CM

## 2018-12-13 DIAGNOSIS — I10 ESSENTIAL HYPERTENSION: Chronic | ICD-10-CM

## 2018-12-13 DIAGNOSIS — N39.0 URINARY TRACT INFECTION WITHOUT HEMATURIA, SITE UNSPECIFIED: ICD-10-CM

## 2018-12-13 PROCEDURE — 99214 OFFICE O/P EST MOD 30 MIN: CPT | Performed by: INTERNAL MEDICINE

## 2018-12-13 NOTE — TELEPHONE ENCOUNTER
Pt wife states she does not feel he needs to be seen at this time. Would just like an order for his PT/INR due today and would like a urine culture. He is only confused off and on. Please advise.   [phone]

## 2018-12-13 NOTE — TELEPHONE ENCOUNTER
Pt's spouse calling back and says he is having a bout with slight confusion ? And thinks it may be a UTI ? Requesting to provide a urine specimen at the same time he has PT/INR done ?   Please call

## 2018-12-13 NOTE — PROGRESS NOTES
Ines Castrejon MD, FAA, [facility]  [facility]  3rd Floor, [address] [facility], [address] [phone]   [facility] [address] [place] [address] Lucila Acosta. [address] [phone]       [address] [address] [phone]    Assessment/Plan:     Obstructive sleep apnea syndrome  Chronic-worsening:  Continue medications per her PCP and other physicians. She instructed not to drive unless had 4 hrs of effective therapy for her ZORAN the night before. Did review the risks of under or untreated ZORAN including, but not limited to, higher risks of motor vehicle accidents, stroke, heart attacks, and death. She understands and accepts all these risks. Discussed options:    1) not to treat obstructive sleep apnea and accept risks of untreated obstructive sleep apnea  2) make changes to settings and actually make an effort to try his machine each night  3) consider surgical options (presented as an option but not recommended). Patient is leaving for [facility] and will decide what he wants to do, patient will follow PRN if wants to treat his obstructive sleep apnea. CAD (coronary artery disease)  Chronic- Stable. Cont meds per PCP and other physicians. Atrial fibrillation (HCC)  Chronic- Stable. Cont meds per PCP and other physicians. Essential hypertension  Chronic- Stable. Cont meds per PCP and other physicians. Diagnoses of Obstructive sleep apnea syndrome, Coronary artery disease involving native heart without angina pectoris, unspecified vessel or lesion type, Chronic atrial fibrillation ([address] and Essential hypertension were pertinent to this visit. The chronic medical conditions listed are directly related to the primary diagnosis listed above. The management of the primary diagnosis affects the secondary diagnosis and vice versa.     Subjective:     Patient ID:

## 2018-12-14 DIAGNOSIS — N39.0 URINARY TRACT INFECTION WITHOUT HEMATURIA, SITE UNSPECIFIED: ICD-10-CM

## 2018-12-14 LAB
BILIRUBIN URINE: NEGATIVE
BLOOD, URINE: NEGATIVE
CLARITY: CLEAR
COLOR: YELLOW
GLUCOSE URINE: NEGATIVE MG/DL
INR BLD: 2.12 (ref 0.86–1.14)
KETONES, URINE: NEGATIVE MG/DL
LEUKOCYTE ESTERASE, URINE: NEGATIVE
MICROSCOPIC EXAMINATION: NORMAL
NITRITE, URINE: NEGATIVE
PH UA: 7
PROTEIN UA: NEGATIVE MG/DL
PROTHROMBIN TIME: 24.2 SEC (ref 9.8–13)
SPECIFIC GRAVITY UA: 1.01
URINE TYPE: NORMAL
UROBILINOGEN, URINE: 0.2 E.U./DL

## 2018-12-16 LAB — URINE CULTURE, ROUTINE: NORMAL

## 2018-12-19 ENCOUNTER — TELEPHONE (OUTPATIENT)
Dept: INTERNAL MEDICINE CLINIC | Age: 83
End: 2018-12-19

## 2018-12-19 ENCOUNTER — ANTI-COAG VISIT (OUTPATIENT)
Dept: INTERNAL MEDICINE CLINIC | Age: 83
End: 2018-12-19

## 2018-12-19 DIAGNOSIS — I48.20 CHRONIC ATRIAL FIBRILLATION (HCC): ICD-10-CM

## 2018-12-19 NOTE — PROGRESS NOTES
Left a message to contact Dr. Bella Robles office for message below regarding coumadin.     Continue the current dose of coumadin and check another protime in one month,   sooner as needed.

## 2019-01-02 ENCOUNTER — OFFICE VISIT (OUTPATIENT)
Dept: INTERNAL MEDICINE CLINIC | Age: 84
End: 2019-01-02
Payer: MEDICARE

## 2019-01-02 VITALS
TEMPERATURE: 98.6 F | WEIGHT: 199.6 LBS | SYSTOLIC BLOOD PRESSURE: 120 MMHG | OXYGEN SATURATION: 97 % | RESPIRATION RATE: 16 BRPM | DIASTOLIC BLOOD PRESSURE: 64 MMHG | BODY MASS INDEX: 27.07 KG/M2 | HEART RATE: 65 BPM

## 2019-01-02 DIAGNOSIS — I48.20 CHRONIC ATRIAL FIBRILLATION (HCC): ICD-10-CM

## 2019-01-02 DIAGNOSIS — I10 ESSENTIAL HYPERTENSION: Chronic | ICD-10-CM

## 2019-01-02 DIAGNOSIS — R29.898 LEG WEAKNESS, BILATERAL: ICD-10-CM

## 2019-01-02 DIAGNOSIS — M15.9 PRIMARY OSTEOARTHRITIS INVOLVING MULTIPLE JOINTS: ICD-10-CM

## 2019-01-02 DIAGNOSIS — I25.10 CORONARY ARTERY DISEASE INVOLVING NATIVE HEART WITHOUT ANGINA PECTORIS, UNSPECIFIED VESSEL OR LESION TYPE: Chronic | ICD-10-CM

## 2019-01-02 DIAGNOSIS — R26.89 BALANCE PROBLEM: ICD-10-CM

## 2019-01-02 DIAGNOSIS — R29.6 RECURRENT FALLS: Primary | ICD-10-CM

## 2019-01-02 PROCEDURE — 99214 OFFICE O/P EST MOD 30 MIN: CPT | Performed by: INTERNAL MEDICINE

## 2019-01-04 ENCOUNTER — TELEPHONE (OUTPATIENT)
Dept: INTERNAL MEDICINE CLINIC | Age: 84
End: 2019-01-04

## 2019-01-30 ENCOUNTER — TELEPHONE (OUTPATIENT)
Dept: INTERNAL MEDICINE CLINIC | Age: 84
End: 2019-01-30

## 2019-02-04 ENCOUNTER — TELEPHONE (OUTPATIENT)
Dept: INTERNAL MEDICINE CLINIC | Age: 84
End: 2019-02-04

## 2019-05-22 ENCOUNTER — OFFICE VISIT (OUTPATIENT)
Dept: INTERNAL MEDICINE CLINIC | Age: 84
End: 2019-05-22
Payer: MEDICARE

## 2019-05-22 VITALS
HEIGHT: 72 IN | OXYGEN SATURATION: 99 % | SYSTOLIC BLOOD PRESSURE: 120 MMHG | DIASTOLIC BLOOD PRESSURE: 72 MMHG | HEART RATE: 68 BPM | WEIGHT: 199 LBS | BODY MASS INDEX: 26.95 KG/M2

## 2019-05-22 DIAGNOSIS — E53.8 B12 DEFICIENCY: ICD-10-CM

## 2019-05-22 DIAGNOSIS — K21.00 GASTROESOPHAGEAL REFLUX DISEASE WITH ESOPHAGITIS: Chronic | ICD-10-CM

## 2019-05-22 DIAGNOSIS — I10 ESSENTIAL HYPERTENSION: Chronic | ICD-10-CM

## 2019-05-22 DIAGNOSIS — I48.20 CHRONIC ATRIAL FIBRILLATION (HCC): ICD-10-CM

## 2019-05-22 DIAGNOSIS — M15.9 PRIMARY OSTEOARTHRITIS INVOLVING MULTIPLE JOINTS: ICD-10-CM

## 2019-05-22 DIAGNOSIS — I25.10 CORONARY ARTERY DISEASE INVOLVING NATIVE CORONARY ARTERY OF NATIVE HEART WITHOUT ANGINA PECTORIS: Chronic | ICD-10-CM

## 2019-05-22 LAB
INR BLD: 1.76 (ref 0.86–1.14)
PROTHROMBIN TIME: 20.1 SEC (ref 9.8–13)

## 2019-05-22 PROCEDURE — 99214 OFFICE O/P EST MOD 30 MIN: CPT | Performed by: INTERNAL MEDICINE

## 2019-05-22 ASSESSMENT — PATIENT HEALTH QUESTIONNAIRE - PHQ9
SUM OF ALL RESPONSES TO PHQ QUESTIONS 1-9: 0
SUM OF ALL RESPONSES TO PHQ QUESTIONS 1-9: 0
2. FEELING DOWN, DEPRESSED OR HOPELESS: 0
SUM OF ALL RESPONSES TO PHQ9 QUESTIONS 1 & 2: 0
1. LITTLE INTEREST OR PLEASURE IN DOING THINGS: 0

## 2019-05-22 NOTE — ASSESSMENT & PLAN NOTE
Has been on lasix, asa and other meds,  Patient is compliant w medications, no side effects, effective, provides adequate symptom relief. No new symptoms or problems as noted by patient. The problem is stable, no changes noted by patient. Will consider monitoring labs and refill medications as appropriate. Patient counseled and will continue current plan.

## 2019-05-22 NOTE — PROGRESS NOTES
PHQ Scores 5/22/2019 12/26/2017 12/16/2016 11/17/2016 5/11/2016 5/11/2015 10/20/2014   PHQ2 Score 0 0 0 0 0 0 0   PHQ9 Score 0 0 0 0 0 0 0     Interpretation of Total Score Depression Severity: 1-4 = Minimal depression, 5-9 = Mild depression, 10-14 = Moderate depression, 15-19 = Moderately severe depression, 20-27 = Severe depression

## 2019-05-22 NOTE — ASSESSMENT & PLAN NOTE
Takes protonix,  Patient is compliant w medications, no side effects, effective, provides adequate symptom relief. No new symptoms or problems as noted by patient. The problem is stable, no changes noted by patient. Will consider monitoring labs and refill medications as appropriate. Patient counseled and will continue current plan.

## 2019-05-22 NOTE — ASSESSMENT & PLAN NOTE
Taking B12 supplements,  Patient is compliant w medications, no side effects, effective, provides adequate symptom relief. No new symptoms or problems as noted by patient. The problem is stable, no changes noted by patient. Will consider monitoring labs and refill medications as appropriate. Patient counseled and will continue current plan.

## 2019-05-22 NOTE — ASSESSMENT & PLAN NOTE
Takes tylenol,   Patient is compliant w medications, no side effects, effective, provides adequate symptom relief. No new symptoms or problems as noted by patient. The problem is stable, no changes noted by patient. Will consider monitoring labs and refill medications as appropriate. Patient counseled and will continue current plan.

## 2019-05-22 NOTE — ASSESSMENT & PLAN NOTE
No chest pain or sob or dizziness or palpitations. Afib is rate controlled, and will continue to titrate coumadin with a goal of a stable therapeutic INR of 2-3. Continue current medications for rate control. Will check monitoring labs as necessary. Wants to get INR here . Will order.

## 2019-05-22 NOTE — PROGRESS NOTES
Subjective:      Patient ID: Chava Mckeon is a 80 y.o. male. HPI  Established patient here for a visit to manage chronic medical conditions as detailed below. Chronic atrial fibrillation (HCC)  No chest pain or sob or dizziness or palpitations. Afib is rate controlled, and will continue to titrate coumadin with a goal of a stable therapeutic INR of 2-3. Continue current medications for rate control. Will check monitoring labs as necessary. Wants to get INR here . Will order. Essential hypertension  This is a chronic problem. The problem is well controlled. Patient monitors readings regularly. Pertinent negatives include no chest pain, focal sensory loss, focal weakness, leg pain, myalgias or shortness of breath. No headaches or chest pain. Takes medications regularly. Blood pressure has been stable, blood work was reviewed, and advised patient to continue the current instructions or medications. Coronary artery disease involving native heart without angina pectoris  Has been on lasix, asa and other meds,  Patient is compliant w medications, no side effects, effective, provides adequate symptom relief. No new symptoms or problems as noted by patient. The problem is stable, no changes noted by patient. Will consider monitoring labs and refill medications as appropriate. Patient counseled and will continue current plan. B12 deficiency  Taking B12 supplements,  Patient is compliant w medications, no side effects, effective, provides adequate symptom relief. No new symptoms or problems as noted by patient. The problem is stable, no changes noted by patient. Will consider monitoring labs and refill medications as appropriate. Patient counseled and will continue current plan. Primary osteoarthritis involving multiple joints  Takes tylenol,   Patient is compliant w medications, no side effects, effective, provides adequate symptom relief.  No new symptoms or problems as noted by patient. The problem is stable, no changes noted by patient. Will consider monitoring labs and refill medications as appropriate. Patient counseled and will continue current plan. Gastroesophageal reflux disease with esophagitis  Takes protonix,  Patient is compliant w medications, no side effects, effective, provides adequate symptom relief. No new symptoms or problems as noted by patient. The problem is stable, no changes noted by patient. Will consider monitoring labs and refill medications as appropriate. Patient counseled and will continue current plan. Review of Systems  ROS: No unusual headaches or allergy symptoms or blurred vision. No prolonged cough. No flushing or facial pain or chest pain,dizziness, dyspnea, palpitations, or chest pain on exertion. No syncope. No nausea or vommitting or diarrhea. No jaundice or abdominal pain, change in bowel habits, black or bloody stools. No dysuria or hematuria or frequency of urination. No myalgias or muscle pain. No numbness, weakness, or tingling. No falls, or loss of consciousness. No weight loss or back pain. No falls. No paresthesias. No joint swelling or redness. No joint pain. No recent weight loss. No focal weakness or sensory deficits or paresthesias, No confusion or altered sensorium. No hematemesis. No hearing loss. No siezures. All other systems were reviewed, and review was negative. Objective:   Physical Exam  /72 (Site: Right Upper Arm, Position: Sitting, Cuff Size: Medium Adult)   Pulse 68   Ht 6' (1.829 m)   Wt 199 lb (90.3 kg)   SpO2 99%   BMI 26.99 kg/m²    The physical exam reveals a patient who appears well, alert and oriented x 3, pleasant, cooperative. Vitals are as noted. Head is atraumatic and normocephalic. Eyes reveal normal conjunctiva, cornea normal, pupils are equal and rective to light. Nasal mucosa is normal. Throat is normal without exudates. Ears reveal normal tympanic membranes. Neck is supple and free of adenopathy, or masses. No thyromegaly. No jugular venous distension. Lungs are clear to auscultation, no rales or rhonchi noted. Heart sounds are regular , no murmurs, clicks, gallops or rubs. Abdomen is soft, no tenderness, masses or organomegaly. Bowel sounds are normally heard. Pelvis: normal. Extremities are normal. Peripheral pulses are normal. Screening neurological exam is normal without focal findings. Cranial nerves are intact, reflexes are symmetrical and muscle strength eaqual. Skin is normal without suspicious lesions noted. Assessment:      Chronic atrial fibrillation (HCC)  No chest pain or sob or dizziness or palpitations. Afib is rate controlled, and will continue to titrate coumadin with a goal of a stable therapeutic INR of 2-3. Continue current medications for rate control. Will check monitoring labs as necessary. Wants to get INR here . Will order. Essential hypertension  This is a chronic problem. The problem is well controlled. Patient monitors readings regularly. Pertinent negatives include no chest pain, focal sensory loss, focal weakness, leg pain, myalgias or shortness of breath. No headaches or chest pain. Takes medications regularly. Blood pressure has been stable, blood work was reviewed, and advised patient to continue the current instructions or medications. Coronary artery disease involving native heart without angina pectoris  Has been on lasix, asa and other meds,  Patient is compliant w medications, no side effects, effective, provides adequate symptom relief. No new symptoms or problems as noted by patient. The problem is stable, no changes noted by patient. Will consider monitoring labs and refill medications as appropriate. Patient counseled and will continue current plan. B12 deficiency  Taking B12 supplements,  Patient is compliant w medications, no side effects, effective, provides adequate symptom relief.  No new symptoms or problems as noted by patient. The problem is stable, no changes noted by patient. Will consider monitoring labs and refill medications as appropriate. Patient counseled and will continue current plan. Primary osteoarthritis involving multiple joints  Takes tylenol,   Patient is compliant w medications, no side effects, effective, provides adequate symptom relief. No new symptoms or problems as noted by patient. The problem is stable, no changes noted by patient. Will consider monitoring labs and refill medications as appropriate. Patient counseled and will continue current plan. Gastroesophageal reflux disease with esophagitis  Takes protonix,  Patient is compliant w medications, no side effects, effective, provides adequate symptom relief. No new symptoms or problems as noted by patient. The problem is stable, no changes noted by patient. Will consider monitoring labs and refill medications as appropriate. Patient counseled and will continue current plan. Plan: Will check protime,   F/u for est visit in June.         Yanick Palacios MD

## 2019-05-23 ENCOUNTER — ANTI-COAG VISIT (OUTPATIENT)
Dept: INTERNAL MEDICINE CLINIC | Age: 84
End: 2019-05-23

## 2019-05-23 DIAGNOSIS — I48.20 CHRONIC ATRIAL FIBRILLATION (HCC): ICD-10-CM

## 2019-06-03 ENCOUNTER — TELEPHONE (OUTPATIENT)
Dept: INTERNAL MEDICINE CLINIC | Age: 84
End: 2019-06-03

## 2019-06-03 ENCOUNTER — OFFICE VISIT (OUTPATIENT)
Dept: INTERNAL MEDICINE CLINIC | Age: 84
End: 2019-06-03
Payer: MEDICARE

## 2019-06-03 ENCOUNTER — ANTI-COAG VISIT (OUTPATIENT)
Dept: INTERNAL MEDICINE CLINIC | Age: 84
End: 2019-06-03

## 2019-06-03 VITALS
DIASTOLIC BLOOD PRESSURE: 86 MMHG | HEART RATE: 62 BPM | WEIGHT: 200 LBS | SYSTOLIC BLOOD PRESSURE: 136 MMHG | OXYGEN SATURATION: 98 % | HEIGHT: 72 IN | BODY MASS INDEX: 27.09 KG/M2

## 2019-06-03 DIAGNOSIS — I48.20 CHRONIC ATRIAL FIBRILLATION (HCC): ICD-10-CM

## 2019-06-03 DIAGNOSIS — E53.8 B12 DEFICIENCY: ICD-10-CM

## 2019-06-03 DIAGNOSIS — M15.9 PRIMARY OSTEOARTHRITIS INVOLVING MULTIPLE JOINTS: ICD-10-CM

## 2019-06-03 DIAGNOSIS — K21.00 GASTROESOPHAGEAL REFLUX DISEASE WITH ESOPHAGITIS: Chronic | ICD-10-CM

## 2019-06-03 DIAGNOSIS — I25.10 CORONARY ARTERY DISEASE INVOLVING NATIVE CORONARY ARTERY OF NATIVE HEART WITHOUT ANGINA PECTORIS: Chronic | ICD-10-CM

## 2019-06-03 DIAGNOSIS — I10 ESSENTIAL HYPERTENSION: Chronic | ICD-10-CM

## 2019-06-03 DIAGNOSIS — R26.89 BALANCE PROBLEM: ICD-10-CM

## 2019-06-03 LAB
INR BLD: 1.39 (ref 0.86–1.14)
PROTHROMBIN TIME: 15.8 SEC (ref 9.8–13)

## 2019-06-03 PROCEDURE — 99214 OFFICE O/P EST MOD 30 MIN: CPT | Performed by: INTERNAL MEDICINE

## 2019-06-03 NOTE — ASSESSMENT & PLAN NOTE
No chest pain or sob or dizziness or palpitations. Afib is rate controlled, and will continue to titrate coumadin with a goal of a stable therapeutic INR of 2-3. Continue current medications for rate control. Will check monitoring labs as necessary.

## 2019-06-03 NOTE — TELEPHONE ENCOUNTER
Patient wife calling -she doesn't feel comfort driving to [address]   Would like the name of a neurology  doctor   closer - Please call patient with name and number

## 2019-06-03 NOTE — ASSESSMENT & PLAN NOTE
Taking PPI,  Patient is compliant w medications, no side effects, effective, provides adequate symptom relief. No new symptoms or problems as noted by patient. The problem is stable, no changes noted by patient. Will consider monitoring labs and refill medications as appropriate. Patient counseled and will continue current plan.

## 2019-06-03 NOTE — ASSESSMENT & PLAN NOTE
Taking his meds,  Patient is compliant w medications, no side effects, effective, provides adequate symptom relief. No new symptoms or problems as noted by patient. The problem is stable, no changes noted by patient. Will consider monitoring labs and refill medications as appropriate. Patient counseled and will continue current plan.

## 2019-06-03 NOTE — ASSESSMENT & PLAN NOTE
Taking supplements,  Patient is compliant w medications, no side effects, effective, provides adequate symptom relief. No new symptoms or problems as noted by patient. The problem is stable, no changes noted by patient. Will consider monitoring labs and refill medications as appropriate. Patient counseled and will continue current plan.

## 2019-06-03 NOTE — ASSESSMENT & PLAN NOTE
Taking otc meds,   Patient is compliant w medications, no side effects, effective, provides adequate symptom relief. No new symptoms or problems as noted by patient. The problem is stable, no changes noted by patient. Will consider monitoring labs and refill medications as appropriate. Patient counseled and will continue current plan.

## 2019-06-03 NOTE — PROGRESS NOTES
Subjective:      Patient ID: Eros Romero is a 80 y.o. male. HPI  Established patient here for a visit to manage chronic medical conditions as detailed below. Essential hypertension  This is a chronic problem. The problem is well controlled. Patient monitors readings regularly. Pertinent negatives include no chest pain, focal sensory loss, focal weakness, leg pain, myalgias or shortness of breath. No headaches or chest pain. Takes medications regularly. Blood pressure has been stable, blood work was reviewed, and advised patient to continue the current instructions or medications. Chronic atrial fibrillation (HCC)  No chest pain or sob or dizziness or palpitations. Afib is rate controlled, and will continue to titrate coumadin with a goal of a stable therapeutic INR of 2-3. Continue current medications for rate control. Will check monitoring labs as necessary. Coronary artery disease involving native heart without angina pectoris  Taking his meds,  Patient is compliant w medications, no side effects, effective, provides adequate symptom relief. No new symptoms or problems as noted by patient. The problem is stable, no changes noted by patient. Will consider monitoring labs and refill medications as appropriate. Patient counseled and will continue current plan. Gastroesophageal reflux disease with esophagitis  Taking PPI,  Patient is compliant w medications, no side effects, effective, provides adequate symptom relief. No new symptoms or problems as noted by patient. The problem is stable, no changes noted by patient. Will consider monitoring labs and refill medications as appropriate. Patient counseled and will continue current plan. B12 deficiency  Taking supplements,  Patient is compliant w medications, no side effects, effective, provides adequate symptom relief. No new symptoms or problems as noted by patient. The problem is stable, no changes noted by patient.  Will consider monitoring labs and refill medications as appropriate. Patient counseled and will continue current plan. Primary osteoarthritis involving multiple joints  Taking otc meds,   Patient is compliant w medications, no side effects, effective, provides adequate symptom relief. No new symptoms or problems as noted by patient. The problem is stable, no changes noted by patient. Will consider monitoring labs and refill medications as appropriate. Patient counseled and will continue current plan. Balance problem  This is getting worse,   Family concerned about parkinson's disease. Will get a neuro eval.     Review of Systems  ROS: No unusual headaches or allergy symptoms or blurred vision. No prolonged cough. No flushing or facial pain or chest pain,dizziness, dyspnea, palpitations, or chest pain on exertion. No syncope. No nausea or vommitting or diarrhea. No jaundice or abdominal pain, change in bowel habits, black or bloody stools. No dysuria or hematuria or frequency of urination. No myalgias or muscle pain. No numbness, weakness, or tingling. No falls, or loss of consciousness. No weight loss or back pain. No falls. No paresthesias. No joint swelling or redness. No joint pain. No recent weight loss. No focal weakness or sensory deficits or paresthesias, No confusion or altered sensorium. No hematemesis. No hearing loss. No siezures. All other systems were reviewed, and review was negative. Objective:   Physical Exam  /86 (Site: Right Upper Arm, Position: Sitting, Cuff Size: Medium Adult)   Pulse 62   Ht 6' (1.829 m)   Wt 200 lb (90.7 kg)   SpO2 98%   BMI 27.12 kg/m²    The physical exam reveals a patient who appears well, alert and oriented x 3, pleasant, cooperative. Vitals are as noted. Head is atraumatic and normocephalic. Eyes reveal normal conjunctiva, cornea normal, pupils are equal and rective to light. Nasal mucosa is normal. Throat is normal without exudates.  Ears reveal normal patient. The problem is stable, no changes noted by patient. Will consider monitoring labs and refill medications as appropriate. Patient counseled and will continue current plan. B12 deficiency  Taking supplements,  Patient is compliant w medications, no side effects, effective, provides adequate symptom relief. No new symptoms or problems as noted by patient. The problem is stable, no changes noted by patient. Will consider monitoring labs and refill medications as appropriate. Patient counseled and will continue current plan. Primary osteoarthritis involving multiple joints  Taking otc meds,   Patient is compliant w medications, no side effects, effective, provides adequate symptom relief. No new symptoms or problems as noted by patient. The problem is stable, no changes noted by patient. Will consider monitoring labs and refill medications as appropriate. Patient counseled and will continue current plan. Balance problem  This is getting worse,   Family concerned about parkinson's disease. Will get a neuro eval.         Plan:      As above,  Will get neurology consult. Will give rx for shingles vaccination,  Will get INR.         Izzy Kee MD

## 2019-06-03 NOTE — TELEPHONE ENCOUNTER
I am not aware of any other neurologist nearby.  If she knows of one, they can see that neurologist.

## 2019-06-04 ENCOUNTER — TELEPHONE (OUTPATIENT)
Dept: INTERNAL MEDICINE CLINIC | Age: 84
End: 2019-06-04

## 2019-06-04 DIAGNOSIS — R26.89 BALANCE PROBLEM: Primary | ICD-10-CM

## 2019-06-04 NOTE — PROGRESS NOTES
Spoke with pt wife and informed her of pt.'s INR results and to continue the current dosage and recheck INR in 2 weeks.

## 2019-06-17 ENCOUNTER — ANTI-COAG VISIT (OUTPATIENT)
Dept: INTERNAL MEDICINE CLINIC | Age: 84
End: 2019-06-17

## 2019-06-17 DIAGNOSIS — I48.20 CHRONIC ATRIAL FIBRILLATION (HCC): ICD-10-CM

## 2019-06-17 LAB
INR BLD: 1.46 (ref 0.86–1.14)
PROTHROMBIN TIME: 16.6 SEC (ref 9.8–13)

## 2019-06-26 ENCOUNTER — HOSPITAL ENCOUNTER (OUTPATIENT)
Dept: PHYSICAL THERAPY | Age: 84
Setting detail: THERAPIES SERIES
Discharge: HOME | End: 2019-06-26
Payer: MEDICARE

## 2019-06-26 VITALS — WEIGHT: 200 LBS | HEIGHT: 72 IN | BODY MASS INDEX: 27.09 KG/M2

## 2019-06-26 PROCEDURE — 97530 THERAPEUTIC ACTIVITIES: CPT

## 2019-06-26 PROCEDURE — 97116 GAIT TRAINING THERAPY: CPT

## 2019-06-26 PROCEDURE — 97162 PT EVAL MOD COMPLEX 30 MIN: CPT

## 2019-06-26 ASSESSMENT — PAIN DESCRIPTION - DESCRIPTORS: DESCRIPTORS: ACHING

## 2019-06-26 ASSESSMENT — PAIN DESCRIPTION - PROGRESSION: CLINICAL_PROGRESSION: NOT CHANGED

## 2019-06-26 ASSESSMENT — PAIN SCALES - GENERAL: PAINLEVEL_OUTOF10: 6

## 2019-06-26 ASSESSMENT — PAIN DESCRIPTION - LOCATION: LOCATION: KNEE

## 2019-06-26 ASSESSMENT — PAIN DESCRIPTION - FREQUENCY: FREQUENCY: CONTINUOUS

## 2019-06-26 ASSESSMENT — PAIN DESCRIPTION - ORIENTATION: ORIENTATION: LEFT;RIGHT;ANTERIOR

## 2019-06-26 NOTE — PROGRESS NOTES
Physical Therapy      Outpatient Physical Therapy  Phone: [phone] Fax: [phone]     To: Referring Practitioner: Dr. Suzy Wright      Patient: Harriet Crawford   : 1930   MRN: [id number]  Evaluation Date: 2019      Diagnosis Information:  · Diagnosis: balance problem   · Treatment Diagnosis: difficulty walking, frequent falls     Physical Therapy Certification/Re-Certification Form  Dear Dr. Suzy Wright:  The following patient has been evaluated for physical therapy services and for therapy to continue, Medicare requires monthly physician review of the treatment plan. Please review the attached evaluation and/or summary of the patient's plan of care, and verify that you agree therapy should continue by signing the attached document and sending it back to our office. Plan of Care/Treatment to date:  [x] Therapeutic Exercise    [] Modalities:  [x] Therapeutic Activity     [] Ultrasound  [] Electrical Stimulation  [x] Gait Training      [] Cervical Traction [] Lumbar Traction  [x] Neuromuscular Re-education    [] Cold/hotpack [] Iontophoresis   [x] Instruction in HEP     Other:  [x] Manual Therapy      []             [] Aquatic Therapy      []           ? Frequency/Duration:  # Days per week: [] 1 day # Weeks: [] 1 week [] 5 weeks     [] 2 days? [] 2 weeks [x] 6 weeks     [x] 3 days   [] 3 weeks [] 7 weeks     [] 4 days   [] 4 weeks [] 8 weeks    Rehab Potential: [] Excellent [x] Good [] Fair  [] Poor       Electronically signed by:  Silvio Ch PT        If you have any questions or concerns, please don't hesitate to call.   Thank you for your referral.      Physician Signature:________________________________Date:__________________  By signing above, therapists plan is approved by physician

## 2019-06-26 NOTE — PROGRESS NOTES
Physical Therapy  Initial Assessment  Date: 2019  Patient Name: Thomas Travis  MRN: [id number]  : 1930     Treatment Diagnosis: difficulty walking, frequent falls    Restrictions       Subjective   General  Chart Reviewed: Yes  Family / Caregiver Present: Yes(wife)  Referring Practitioner: Dr. Vj Mendez  Referral Date : 19  Diagnosis: balance problem  Follows Commands: Within Functional Limits  PT Visit Information  PT Insurance Information: [employer], covered 100%  Subjective  Subjective: Patient and his spouse into clinic reporting increased weakness generally, frequent falling, and difficulty walking. His PCP recommended that patient start in physical therapy. Current level of function:  patient walks household distances without assistive devices, but owns several walkers and a cane. He tends to use one of these devices when out in the community for ambulation. Is limited to short distance walking only in the community due to feeling generally weak and having a tendency to fall. In fact, patient reports that he fell yesterday while walking on his sidewalk with a walker. EMS was called to assist with getting him up and back into his home. He was not injured in the fall. He does experience chronic knee pain bilaterally that he rates as 6/10, but for which he takes no medication. All transitional movements are slow as is his walking. [facility] and uses a stair  in order to manage the 18 steps to enter. He does have some assistance from his wife with LE dressing, but is otherwise fairly independent with dressing and bathing and toileting. He reports incontinence of bowel and bladder and wears Depends undergarments for management (encouraged patient to see a urologist for this).       Prior level of function: earlier this year, patient was riding a tricycle in [place] several miles a day, but has lost some strength since returning home and not being able to continue that activity. Patient goal: \"walk better\"    Pain Screening  Patient Currently in Pain: Yes  Pain Assessment  Pain Assessment: 0-10  Pain Level: 6  Pain Location: Knee  Pain Orientation: Left;Right; Anterior  Pain Descriptors: Aching  Pain Frequency: Continuous  Clinical Progression: Not changed  Vital Signs  Patient Currently in Pain: Yes  Height and Weight  Height: 6' (182.9 cm)  Weight: 200 lb (90.7 kg)  Weight Method: Stated  BSA (Calculated - sq m): 2.15 sq meters  BMI (Calculated): 27.2    Vision/Hearing  Vision  Vision: Impaired  Hearing  Hearing: Exceptions to [facility]  Hearing Exceptions: Bilateral hearing aid    Orientation  Orientation  Overall Orientation Status: Within Functional Limits    Social/Functional History  Social/Functional History  Lives With: Spouse  Type of Home: (condo)  Home Layout: One level  Home Access: Stairs to enter without rails;Elevator  Entrance Stairs - Number of Steps: 18  Bathroom Shower/Tub: Walk-in shower; Shower chair with back  [employer]: Handicap height  Bathroom Equipment: Grab bars in shower  Bathroom Accessibility: Accessible  Home Equipment: Rolling walker;4 wheeled walker;Cane;Lift chair;Grab bars; [address] Help From: Family  ADL Assistance: Needs assistance  Homemaking Responsibilities: No  Ambulation Assistance: Needs assistance  Transfer Assistance: Needs assistance(uses handrails for commode, has some trouble getting in/out of bed)  Active : Yes  Mode of Transportation: Car  Occupation: Retired  Type of occupation: retired from sales  [employer]: enjoys time with his wife, television    Objective     Observation/Palpation  Posture: Poor(generally kyphotic with severe forward head)  Observation: noted resting tremors (moderate)    AROM RLE (degrees)  RLE AROM: WFL  RLE General AROM: limited IR at bilateral hips to 5 deg  AROM LLE (degrees)  LLE AROM : WFL  AROM RUE (degrees)  RUE General AROM: able to reach overhead to about 110 deg, reaches behind his back to the lumbar region, able to touch the back of his head  AROM LUE (degrees)  LUE General AROM: able to reach overhead to about 110 deg, reaches behind his back to the lumbar region, able to touch the back of his head  Spine  Cervical: cervical ext 10 deg, right rot 38 deg, left rot 45 deg, right sidebend 5 deg, left side bend 11 deg; flexion WNL    Strength RLE  Strength RLE: Exception  R Hip Flexion: 4-/5  R Hip Extension: 3/5  R Hip ABduction: 3-/5  R Knee Flexion: 4/5  R Knee Extension: 4/5  R Ankle Dorsiflexion: 4+/5  R Ankle Plantar flexion: 4+/5  Strength LLE  Strength LLE: Exception  L Hip Flexion: 3+/5  L Hip Extension: 3/5  L Hip ABduction: 3-/5  L Knee Flexion: 4/5  L Knee Extension: 4/5  L Ankle Dorsiflexion: 4+/5  L Ankle Plantar Flexion: 4+/5  Strength RUE  Strength RUE: Exception  R Shoulder Flexion: 4-/5  R Shoulder ABduction: 4-/5  R Shoulder Internal Rotation: 4/5  R Shoulder External Rotation: 4/5  R Elbow Flexion: 4+/5  R Elbow Extension: 4+/5  Strength LUE  Strength LUE: Exception  L Shoulder Flexion: 4-/5  L Shoulder ABduction: 4-/5  L Shoulder Internal Rotation: 4-/5  L Shoulder External Rotation: 4-/5  L Elbow Flexion: 4+/5  L Elbow Extension: 4+/5  Tone RLE  RLE Tone: Normotonic  Tone LLE  LLE Tone: Normotonic  Motor Control  Gross Motor?: Exceptions  Additional Measures  Flexibility: tight hamstrings, ITB, and gastroc noted but not formally measured this date  Special Tests: 30 sec STS: 3 reps  Sensation  Overall Sensation Status: WFL  Bed mobility  Rolling to Left: Independent  Rolling to Right: Independent  Supine to Sit: Independent  Sit to Supine: Independent  Transfers  Sit to Stand: Modified independent(needs extra time and hands to assist, multiple attempts required)  Stand to sit: Modified independent       Ambulation  Ambulation?: Yes  Ambulation 1  Surface: level tile  Device: No Device  Assistance: Contact guard assistance  Gait Deviations: Slow Seble;Decreased step length;Decreased arm swing;Decreased head and trunk rotation;Deviated path;Shuffles  Distance: short community distances  Balance  Posture: Poor  Sitting - Static: Good  Sitting - Dynamic: Good  Standing - Static: Good  Standing - Dynamic: Good;-     Outpatient fall risk assessment completed asking screening question if patient has fallen in the past 30 days:  [x] Yes  [] No    Based on screen for falls, patient demonstrates fall risk:  [x] Yes  [] No    Interventions based on fall risk status:  Updated Problem List within Medical History  [x] Yes   [] N/A    Asked family to assist with increased observation of the patient  [x] Yes   [] N/A    Patient kept in visible area when not closely supervised by therapist  [x] Yes   [] N/A    Repeatedly reinforce activity limits and safety needs with patient/family  [x] Yes   [] N/A    Increase frequency of rounding/monitoring patient  [x] Yes   [] N/A                             Assessment   Conditions Requiring Skilled Therapeutic Intervention  Body structures, Functions, Activity limitations: Decreased functional mobility ; Decreased ADL status; Decreased strength; Increased Pain;Decreased endurance;Decreased vision/visual deficit; Decreased safe awareness;Decreased balance;Decreased ROM  Treatment Diagnosis: difficulty walking, frequent falls  Prognosis: Good  Decision Making: Medium Complexity  Patient Education: PT POC and goals  Barriers to Learning: [place]  REQUIRES PT FOLLOW UP: Yes  Activity Tolerance  Activity Tolerance: Patient Tolerated treatment well         Plan   Plan  Times per week: 3 x week for 6 weeks for a total of 18 visits  Current Treatment Recommendations: Strengthening, ROM, Balance Training, Functional Mobility Training, Transfer Training, Endurance Training, Gait Training, Neuromuscular Re-education, Manual Therapy - Soft Tissue Mobilization, Home Exercise Program, Safety Education &

## 2019-06-26 NOTE — FLOWSHEET NOTE
Physical Therapy Daily Treatment Note    Date:  2019    Patient Name:  Karena Walker    :  1930  MRN: [id number]  Restrictions/Precautions:    Medical/Treatment Diagnosis Information:   · Diagnosis: balance problem  · Treatment Diagnosis: difficulty walking, frequent falls    Tracking Information:  Physician Information Referring Practitioner: Dr. Maged Del Toro of Care Sent Date:  Signed Received:    Visit Count / Total Visits      Insurance Approved Visits  /  Approved Dates:     Insurance Information PT Insurance Information: ALESHA [address] [employer], covered 100%     Progress Note/G-codes   [x]  Yes  []  No Next Due:      Pain level: 6/10 knees    Subjective: see evaluation;  Scheduled for 30 min sessions, but that could increase depending on exercise tolerance at next session. Needs to schedule his follow up visits after next visit on      Objective:  Observation:   Test measurements:      Exercises:  Exercise/Equipment Resistance/Repetitions Other comments                                                                            Other Therapeutic Activities:   : POC and goals of PT reviewed with the patient to his understanding and patient and spouse verbally agreed to the plan.     Home Exercise Program:      Manual Treatments:      Modalities:      Timed Code Treatment Minutes:  60    Total Treatment Minutes:  75    Treatment/Activity Tolerance:  [x] Patient tolerated treatment well [] Patient limited by fatigue  [] Patient limited by pain  [] Patient limited by other medical complications  [] Other:     Prognosis: [x] Good [] Fair  [] Poor    Patient Requires Follow-up: [x] Yes  [] No    Plan:   [] Continue per plan of care [] Alter current plan (see comments)  [x] Plan of care initiated [] Hold pending MD visit [] Discharge  Plan for Next Session:  Gait training (spouse would like to bring in patient's walkers next visit to make sure that the height is correct);  LE strengthening, postural strengthening, balance ex    Electronically signed by:  Coy Mcqueen

## 2019-06-28 ENCOUNTER — HOSPITAL ENCOUNTER (OUTPATIENT)
Dept: PHYSICAL THERAPY | Age: 84
Setting detail: THERAPIES SERIES
Discharge: HOME | End: 2019-06-28
Payer: MEDICARE

## 2019-06-28 PROCEDURE — 97116 GAIT TRAINING THERAPY: CPT

## 2019-06-28 PROCEDURE — 97530 THERAPEUTIC ACTIVITIES: CPT

## 2019-06-28 PROCEDURE — 97110 THERAPEUTIC EXERCISES: CPT
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
## 2019-07-01 ENCOUNTER — OFFICE VISIT (OUTPATIENT)
Dept: INTERNAL MEDICINE CLINIC | Age: 84
End: 2019-07-01
Payer: MEDICARE

## 2019-07-01 VITALS
OXYGEN SATURATION: 98 % | BODY MASS INDEX: 27.36 KG/M2 | HEIGHT: 72 IN | DIASTOLIC BLOOD PRESSURE: 80 MMHG | WEIGHT: 202 LBS | SYSTOLIC BLOOD PRESSURE: 132 MMHG | HEART RATE: 63 BPM

## 2019-07-01 DIAGNOSIS — I10 ESSENTIAL HYPERTENSION: Chronic | ICD-10-CM

## 2019-07-01 DIAGNOSIS — G47.33 OBSTRUCTIVE SLEEP APNEA SYNDROME: Chronic | ICD-10-CM

## 2019-07-01 DIAGNOSIS — R53.81 PHYSICAL DECONDITIONING: ICD-10-CM

## 2019-07-01 DIAGNOSIS — K21.00 GASTROESOPHAGEAL REFLUX DISEASE WITH ESOPHAGITIS: Chronic | ICD-10-CM

## 2019-07-01 DIAGNOSIS — I48.20 CHRONIC ATRIAL FIBRILLATION (HCC): ICD-10-CM

## 2019-07-01 DIAGNOSIS — E53.8 B12 DEFICIENCY: ICD-10-CM

## 2019-07-01 DIAGNOSIS — N39.42 URINARY INCONTINENCE WITHOUT SENSORY AWARENESS: Primary | Chronic | ICD-10-CM

## 2019-07-01 DIAGNOSIS — R25.1 TREMOR: ICD-10-CM

## 2019-07-01 LAB
INR BLD: 1.18 (ref 0.86–1.14)
PROTHROMBIN TIME: 13.4 SEC (ref 9.8–13)

## 2019-07-01 PROCEDURE — 99214 OFFICE O/P EST MOD 30 MIN: CPT | Performed by: INTERNAL MEDICINE

## 2019-07-01 RX ORDER — OXYBUTYNIN CHLORIDE 10 MG/1
10 TABLET, EXTENDED RELEASE ORAL DAILY
Qty: 30 TABLET | Refills: 5 | Status: SHIPPED | OUTPATIENT
Start: 2019-07-01 | End: 2019-08-15

## 2019-07-01 NOTE — PROGRESS NOTES
Subjective:      Patient ID: Jesse Michaels is a 80 y.o. male. HPI  Established patient here for a visit to manage chronic medical conditions as detailed below. 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 Patient is compliant w medications, no side effects, effective, provides adequate symptom relief. No new symptoms or problems as noted by patient. The problem is stable, no changes noted by patient. Will consider monitoring labs and refill medications as appropriate. Patient counseled and will continue current plan. Essential hypertension  This is a chronic problem. The problem is well controlled. Patient monitors readings regularly. Pertinent negatives include no chest pain, focal sensory loss, focal weakness, leg pain, myalgias or shortness of breath. No headaches or chest pain. Takes medications regularly. Blood pressure has been stable, blood work was reviewed, and advised patient to continue the current instructions or medications. Chronic atrial fibrillation (HCC)  No chest pain or sob or dizziness or palpitations. Afib is rate controlled, and will continue to titrate coumadin with a goal of a stable therapeutic INR of 2-3. Continue current medications for rate control. Will check monitoring labs as necessary. Gastroesophageal reflux disease with esophagitis  On ppi,   Patient is compliant w medications, no side effects, effective, provides adequate symptom relief. No new symptoms or problems as noted by patient. The problem is stable, no changes noted by patient. Will consider monitoring labs and refill medications as appropriate. Patient counseled and will continue current plan. Obstructive sleep apnea syndrome  Has been stable,  Continue current medications. Tremor  Waiting to see neurology. B12 deficiency  Takes b12 supplements.   Patient is compliant w medications, no side effects, effective, provides adequate

## 2019-07-01 NOTE — ASSESSMENT & PLAN NOTE
Takes b12 supplements. Patient is compliant w medications, no side effects, effective, provides adequate symptom relief. No new symptoms or problems as noted by patient. The problem is stable, no changes noted by patient. Will consider monitoring labs and refill medications as appropriate. Patient counseled and will continue current plan.

## 2019-07-01 NOTE — ASSESSMENT & PLAN NOTE
On ppi,   Patient is compliant w medications, no side effects, effective, provides adequate symptom relief. No new symptoms or problems as noted by patient. The problem is stable, no changes noted by patient. Will consider monitoring labs and refill medications as appropriate. Patient counseled and will continue current plan.

## 2019-07-01 NOTE — ASSESSMENT & PLAN NOTE
Will try ditropan and see how much that helps. Patient is compliant w medications, no side effects, effective, provides adequate symptom relief. No new symptoms or problems as noted by patient. The problem is stable, no changes noted by patient. Will consider monitoring labs and refill medications as appropriate. Patient counseled and will continue current plan.

## 2019-07-02 ENCOUNTER — ANTI-COAG VISIT (OUTPATIENT)
Dept: INTERNAL MEDICINE CLINIC | Age: 84
End: 2019-07-02

## 2019-07-02 ENCOUNTER — TELEPHONE (OUTPATIENT)
Dept: FAMILY MEDICINE CLINIC | Age: 84
End: 2019-07-02

## 2019-07-02 ENCOUNTER — TELEPHONE (OUTPATIENT)
Dept: INTERNAL MEDICINE CLINIC | Age: 84
End: 2019-07-02

## 2019-07-02 ENCOUNTER — HOSPITAL ENCOUNTER (OUTPATIENT)
Dept: PHYSICAL THERAPY | Age: 84
Setting detail: THERAPIES SERIES
Discharge: HOME | End: 2019-07-02
Payer: MEDICARE

## 2019-07-02 ENCOUNTER — CARE COORDINATION (OUTPATIENT)
Dept: CARE COORDINATION | Age: 84
End: 2019-07-02

## 2019-07-02 DIAGNOSIS — I48.20 CHRONIC ATRIAL FIBRILLATION (HCC): ICD-10-CM

## 2019-07-02 PROCEDURE — 97140 MANUAL THERAPY 1/> REGIONS: CPT | Performed by: PHYSICAL THERAPIST

## 2019-07-02 PROCEDURE — 97110 THERAPEUTIC EXERCISES: CPT | Performed by: PHYSICAL THERAPIST

## 2019-07-02 PROCEDURE — 97112 NEUROMUSCULAR REEDUCATION: CPT | Performed by: PHYSICAL THERAPIST

## 2019-07-02 NOTE — CARE COORDINATION
Nurse Care Coordinator called and spoke to patient's on the phone today. She spoke to LSW earlier today about home support needs. Was referred to [facility] for services. Wife says she is going to call today. Says she would like him to have an \"assessment\" to see what services are offered that patient may benefit from. He had a emergency response system recently but patient/wife sent it back because it was too difficult to use, needed to be charged, reset button. Encouraged wife to discuss options for different emergency response systems that may be available with [facility]. Wife drives patient to PT twice per week. She says she doesn't see a big improvement in his mobility. He uses a cane with ambulation. Patient recently renewed his drivers license. He drove himself to [employer] over the weekend for a coffee and pie. Wife says he sets up his own pill box weekly, she is not sure if he takes medications. Says pills are gone for this morning but still in Saturday and Sunday compartment. She doesn't know when he filled pill box. Encouraged her to observe patient set up up his pill box weekly, remind him about doses if needed. Patient agrees to call LSW for needs with accessing [facility] services. No barriers with obtaining medications or attending appointments.

## 2019-07-02 NOTE — TELEPHONE ENCOUNTER
Spouse calling back to speak to SW and well as get Protime results. Please call Pt to discuss further.

## 2019-07-05 ENCOUNTER — HOSPITAL ENCOUNTER (OUTPATIENT)
Dept: PHYSICAL THERAPY | Age: 84
Setting detail: THERAPIES SERIES
Discharge: HOME | End: 2019-07-05
Payer: MEDICARE

## 2019-07-05 PROCEDURE — 97110 THERAPEUTIC EXERCISES: CPT

## 2019-07-05 PROCEDURE — 97116 GAIT TRAINING THERAPY: CPT

## 2019-07-05 PROCEDURE — 97112 NEUROMUSCULAR REEDUCATION: CPT
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
## 2019-07-08 ENCOUNTER — HOSPITAL ENCOUNTER (OUTPATIENT)
Dept: PHYSICAL THERAPY | Age: 84
Setting detail: THERAPIES SERIES
Discharge: HOME | End: 2019-07-08
Payer: MEDICARE

## 2019-07-08 PROCEDURE — 97530 THERAPEUTIC ACTIVITIES: CPT

## 2019-07-08 PROCEDURE — 97112 NEUROMUSCULAR REEDUCATION: CPT
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
## 2019-07-09 ENCOUNTER — HOSPITAL ENCOUNTER (OUTPATIENT)
Dept: PHYSICAL THERAPY | Age: 84
Setting detail: THERAPIES SERIES
Discharge: HOME | End: 2019-07-09
Payer: MEDICARE

## 2019-07-09 PROCEDURE — 97116 GAIT TRAINING THERAPY: CPT

## 2019-07-09 PROCEDURE — 97110 THERAPEUTIC EXERCISES: CPT

## 2019-07-12 ENCOUNTER — TELEPHONE (OUTPATIENT)
Dept: FAMILY MEDICINE CLINIC | Age: 84
End: 2019-07-12

## 2019-07-15 ENCOUNTER — TELEPHONE (OUTPATIENT)
Dept: FAMILY MEDICINE CLINIC | Age: 84
End: 2019-07-15

## 2019-07-15 ENCOUNTER — APPOINTMENT (OUTPATIENT)
Dept: PHYSICAL THERAPY | Age: 84
End: 2019-07-15
Payer: MEDICARE

## 2019-07-15 NOTE — TELEPHONE ENCOUNTER
Online referral made per request of caregiver as she reports pt has memory deficits interfering with daily life. Caregiver would like information on Dementia and supportive services and agrees to be contacted. Pt has no documented Dx of any type of Dementia. They will provide her with a list of assessment centers.

## 2019-07-17 ENCOUNTER — TELEPHONE (OUTPATIENT)
Dept: FAMILY MEDICINE CLINIC | Age: 84
End: 2019-07-17

## 2019-07-17 ENCOUNTER — APPOINTMENT (OUTPATIENT)
Dept: PHYSICAL THERAPY | Age: 84
End: 2019-07-17
Payer: MEDICARE

## 2019-07-17 ENCOUNTER — TELEPHONE (OUTPATIENT)
Dept: INTERNAL MEDICINE CLINIC | Age: 84
End: 2019-07-17

## 2019-07-17 NOTE — TELEPHONE ENCOUNTER
Luis Angel Hopkins from [facility] would like to know if Dr. Lynn Mercado will carry order for Pt. For hospitalization.    [facility] Phone # [phone]

## 2019-07-17 NOTE — TELEPHONE ENCOUNTER
pATIENT wife needs a call back on the ER visit that he went to at [facility]. Though he had UTI but not. She wants us to get copies of the tests done yesterday. He did have his INR too .  fax  [phone] request all records and give his  . Medical [phone] records . She wants these released . Call wife back about this.  She has some questions too

## 2019-07-19 ENCOUNTER — APPOINTMENT (OUTPATIENT)
Dept: PHYSICAL THERAPY | Age: 84
End: 2019-07-19
Payer: MEDICARE

## 2019-07-22 ENCOUNTER — TELEPHONE (OUTPATIENT)
Dept: INTERNAL MEDICINE CLINIC | Age: 84
End: 2019-07-22

## 2019-07-23 ENCOUNTER — APPOINTMENT (OUTPATIENT)
Dept: PHYSICAL THERAPY | Age: 84
End: 2019-07-23
Payer: MEDICARE

## 2019-07-25 ENCOUNTER — APPOINTMENT (OUTPATIENT)
Dept: PHYSICAL THERAPY | Age: 84
End: 2019-07-25
Payer: MEDICARE

## 2019-07-29 ENCOUNTER — APPOINTMENT (OUTPATIENT)
Dept: PHYSICAL THERAPY | Age: 84
End: 2019-07-29
Payer: MEDICARE

## 2019-07-31 ENCOUNTER — TELEPHONE (OUTPATIENT)
Dept: INTERNAL MEDICINE CLINIC | Age: 84
End: 2019-07-31
Payer: MEDICARE

## 2019-07-31 DIAGNOSIS — M19.90 SENILE ARTHRITIS: Primary | ICD-10-CM

## 2019-07-31 PROCEDURE — G0180 MD CERTIFICATION HHA PATIENT: HCPCS | Performed by: INTERNAL MEDICINE

## 2019-08-06 ENCOUNTER — TELEPHONE (OUTPATIENT)
Dept: PRIMARY CARE CLINIC | Age: 84
End: 2019-08-06

## 2019-08-07 ENCOUNTER — TELEPHONE (OUTPATIENT)
Dept: PRIMARY CARE CLINIC | Age: 84
End: 2019-08-07

## 2019-08-07 ENCOUNTER — ANTI-COAG VISIT (OUTPATIENT)
Dept: GYNECOLOGY | Age: 84
End: 2019-08-07

## 2019-08-07 DIAGNOSIS — I48.20 CHRONIC ATRIAL FIBRILLATION (HCC): ICD-10-CM

## 2019-08-07 LAB — INR BLD: 1.9

## 2019-08-09 ENCOUNTER — TELEPHONE (OUTPATIENT)
Dept: PRIMARY CARE CLINIC | Age: 84
End: 2019-08-09

## 2019-08-12 ENCOUNTER — TELEPHONE (OUTPATIENT)
Dept: FAMILY MEDICINE CLINIC | Age: 84
End: 2019-08-12

## 2019-08-12 NOTE — TELEPHONE ENCOUNTER
She needs to call and confirm with his  cardiologist, if its ok for him to be on eliquis instead of coumadin.

## 2019-08-13 ENCOUNTER — TELEPHONE (OUTPATIENT)
Dept: FAMILY MEDICINE CLINIC | Age: 84
End: 2019-08-13

## 2019-08-15 ENCOUNTER — OFFICE VISIT (OUTPATIENT)
Dept: PRIMARY CARE CLINIC | Age: 84
End: 2019-08-15
Payer: MEDICARE

## 2019-08-15 ENCOUNTER — TELEPHONE (OUTPATIENT)
Dept: PRIMARY CARE CLINIC | Age: 84
End: 2019-08-15

## 2019-08-15 ENCOUNTER — TELEPHONE (OUTPATIENT)
Dept: FAMILY MEDICINE CLINIC | Age: 84
End: 2019-08-15

## 2019-08-15 VITALS
SYSTOLIC BLOOD PRESSURE: 100 MMHG | DIASTOLIC BLOOD PRESSURE: 70 MMHG | BODY MASS INDEX: 25.98 KG/M2 | WEIGHT: 191.8 LBS | OXYGEN SATURATION: 96 % | HEART RATE: 70 BPM | HEIGHT: 72 IN

## 2019-08-15 DIAGNOSIS — I10 ESSENTIAL HYPERTENSION: ICD-10-CM

## 2019-08-15 DIAGNOSIS — I48.20 CHRONIC ATRIAL FIBRILLATION (HCC): Primary | ICD-10-CM

## 2019-08-15 DIAGNOSIS — R25.1 TREMOR: ICD-10-CM

## 2019-08-15 DIAGNOSIS — G47.33 OBSTRUCTIVE SLEEP APNEA SYNDROME: ICD-10-CM

## 2019-08-15 DIAGNOSIS — K21.00 GASTROESOPHAGEAL REFLUX DISEASE WITH ESOPHAGITIS: ICD-10-CM

## 2019-08-15 DIAGNOSIS — E53.8 B12 DEFICIENCY: ICD-10-CM

## 2019-08-15 DIAGNOSIS — R19.7 DIARRHEA, UNSPECIFIED TYPE: ICD-10-CM

## 2019-08-15 DIAGNOSIS — I48.20 CHRONIC ATRIAL FIBRILLATION (HCC): ICD-10-CM

## 2019-08-15 DIAGNOSIS — N39.42 URINARY INCONTINENCE WITHOUT SENSORY AWARENESS: ICD-10-CM

## 2019-08-15 LAB
A/G RATIO: 1.4 (ref 1.1–2.2)
ALBUMIN SERPL-MCNC: 4.4 G/DL (ref 3.4–5)
ALP BLD-CCNC: 77 U/L (ref 40–129)
ALT SERPL-CCNC: 12 U/L (ref 10–40)
ANION GAP SERPL CALCULATED.3IONS-SCNC: 15 MMOL/L (ref 3–16)
AST SERPL-CCNC: 16 U/L (ref 15–37)
BASOPHILS ABSOLUTE: 0 K/UL (ref 0–0.2)
BASOPHILS RELATIVE PERCENT: 0.4 %
BILIRUB SERPL-MCNC: 0.8 MG/DL (ref 0–1)
BUN BLDV-MCNC: 56 MG/DL (ref 7–20)
CALCIUM SERPL-MCNC: 10.2 MG/DL (ref 8.3–10.6)
CHLORIDE BLD-SCNC: 97 MMOL/L (ref 99–110)
CO2: 32 MMOL/L (ref 21–32)
CREAT SERPL-MCNC: 1.7 MG/DL (ref 0.8–1.3)
EOSINOPHILS ABSOLUTE: 0.1 K/UL (ref 0–0.6)
EOSINOPHILS RELATIVE PERCENT: 1.5 %
GFR AFRICAN AMERICAN: 46
GFR NON-AFRICAN AMERICAN: 38
GLOBULIN: 3.2 G/DL
GLUCOSE BLD-MCNC: 101 MG/DL (ref 70–99)
HCT VFR BLD CALC: 44.8 % (ref 40.5–52.5)
HEMOGLOBIN: 15 G/DL (ref 13.5–17.5)
INR BLD: 2.11 (ref 0.86–1.14)
LYMPHOCYTES ABSOLUTE: 2.4 K/UL (ref 1–5.1)
LYMPHOCYTES RELATIVE PERCENT: 30.5 %
MCH RBC QN AUTO: 30.5 PG (ref 26–34)
MCHC RBC AUTO-ENTMCNC: 33.4 G/DL (ref 31–36)
MCV RBC AUTO: 91.4 FL (ref 80–100)
MONOCYTES ABSOLUTE: 0.7 K/UL (ref 0–1.3)
MONOCYTES RELATIVE PERCENT: 9.4 %
NEUTROPHILS ABSOLUTE: 4.6 K/UL (ref 1.7–7.7)
NEUTROPHILS RELATIVE PERCENT: 58.2 %
PDW BLD-RTO: 14.3 % (ref 12.4–15.4)
PLATELET # BLD: 298 K/UL (ref 135–450)
PMV BLD AUTO: 8.4 FL (ref 5–10.5)
POTASSIUM SERPL-SCNC: 3.5 MMOL/L (ref 3.5–5.1)
PROTHROMBIN TIME: 24.1 SEC (ref 9.8–13)
RBC # BLD: 4.9 M/UL (ref 4.2–5.9)
SODIUM BLD-SCNC: 144 MMOL/L (ref 136–145)
TOTAL PROTEIN: 7.6 G/DL (ref 6.4–8.2)
WBC # BLD: 7.8 K/UL (ref 4–11)

## 2019-08-15 PROCEDURE — 99214 OFFICE O/P EST MOD 30 MIN: CPT | Performed by: INTERNAL MEDICINE

## 2019-08-15 NOTE — PROGRESS NOTES
masses or organomegaly. Bowel sounds are normally heard. Pelvis: normal. Extremities are normal. Peripheral pulses are normal. Screening neurological exam is normal without focal findings. Cranial nerves are intact, reflexes are symmetrical and muscle strength eaqual. Skin is normal without suspicious lesions noted. Assessment:      Urinary incontinence without sensory awareness  Will try ditropan and see how much that helps. Patient is compliant w medications, no side effects, effective, provides adequate symptom relief. No new symptoms or problems as noted by patient. The problem is stable, no changes noted by patient. Will consider monitoring labs and refill medications as appropriate. Patient counseled and will continue current plan.      Essential hypertension  This is a chronic problem. The problem is well controlled. Patient monitors readings regularly. Pertinent negatives include no chest pain, focal sensory loss, focal weakness, leg pain, myalgias or shortness of breath. No headaches or chest pain. Takes medications regularly. Blood pressure has been stable, blood work was reviewed, and advised patient to continue the current instructions or medications.       Chronic atrial fibrillation (HCC)  No chest pain or sob or dizziness or palpitations. Afib is rate controlled, and will continue to titrate coumadin with a goal of a stable therapeutic INR of 2-3. Continue current medications for rate control. Will check monitoring labs as necessary.      Gastroesophageal reflux disease with esophagitis  On ppi,   Patient is compliant w medications, no side effects, effective, provides adequate symptom relief. No new symptoms or problems as noted by patient. The problem is stable, no changes noted by patient. Will consider monitoring labs and refill medications as appropriate.  Patient counseled and will continue current plan.      Obstructive sleep apnea syndrome  Has been stable,  Continue current

## 2019-08-16 DIAGNOSIS — I10 ESSENTIAL HYPERTENSION: Primary | ICD-10-CM

## 2019-08-21 ENCOUNTER — TELEPHONE (OUTPATIENT)
Dept: PRIMARY CARE CLINIC | Age: 84
End: 2019-08-21

## 2019-08-21 DIAGNOSIS — R19.7 DIARRHEA, UNSPECIFIED TYPE: Primary | ICD-10-CM

## 2019-08-21 NOTE — TELEPHONE ENCOUNTER
The CBC w Diff and the CMP were rejected. The specimens sent to the lab only had a last name on the specimens.

## 2019-09-12 ENCOUNTER — TELEPHONE (OUTPATIENT)
Dept: FAMILY MEDICINE CLINIC | Age: 84
End: 2019-09-12

## 2019-09-13 ENCOUNTER — ANTI-COAG VISIT (OUTPATIENT)
Dept: PRIMARY CARE CLINIC | Age: 84
End: 2019-09-13

## 2019-09-13 ENCOUNTER — TELEPHONE (OUTPATIENT)
Dept: PRIMARY CARE CLINIC | Age: 84
End: 2019-09-13

## 2019-09-13 LAB — INR BLD: 1.4

## 2019-09-16 ENCOUNTER — TELEPHONE (OUTPATIENT)
Dept: PRIMARY CARE CLINIC | Age: 84
End: 2019-09-16

## 2019-09-16 NOTE — TELEPHONE ENCOUNTER
[facility] needs clarification of what dosage of Coumadin he is supposed to be taking?

## 2019-09-17 ENCOUNTER — OFFICE VISIT (OUTPATIENT)
Dept: PRIMARY CARE CLINIC | Age: 84
End: 2019-09-17
Payer: MEDICARE

## 2019-09-17 ENCOUNTER — TELEPHONE (OUTPATIENT)
Dept: PRIMARY CARE CLINIC | Age: 84
End: 2019-09-17

## 2019-09-17 VITALS
SYSTOLIC BLOOD PRESSURE: 138 MMHG | BODY MASS INDEX: 26.55 KG/M2 | RESPIRATION RATE: 16 BRPM | DIASTOLIC BLOOD PRESSURE: 84 MMHG | HEART RATE: 60 BPM | WEIGHT: 196 LBS | OXYGEN SATURATION: 96 % | HEIGHT: 72 IN | TEMPERATURE: 97.5 F

## 2019-09-17 VITALS
TEMPERATURE: 97.5 F | HEIGHT: 72 IN | BODY MASS INDEX: 26.55 KG/M2 | OXYGEN SATURATION: 96 % | RESPIRATION RATE: 16 BRPM | SYSTOLIC BLOOD PRESSURE: 138 MMHG | HEART RATE: 64 BPM | WEIGHT: 196 LBS | DIASTOLIC BLOOD PRESSURE: 84 MMHG

## 2019-09-17 DIAGNOSIS — G47.33 OBSTRUCTIVE SLEEP APNEA SYNDROME: Chronic | ICD-10-CM

## 2019-09-17 DIAGNOSIS — M15.9 PRIMARY OSTEOARTHRITIS INVOLVING MULTIPLE JOINTS: ICD-10-CM

## 2019-09-17 DIAGNOSIS — R26.89 BALANCE PROBLEM: ICD-10-CM

## 2019-09-17 DIAGNOSIS — I10 ESSENTIAL HYPERTENSION: Chronic | ICD-10-CM

## 2019-09-17 DIAGNOSIS — I25.10 CORONARY ARTERY DISEASE INVOLVING NATIVE CORONARY ARTERY OF NATIVE HEART WITHOUT ANGINA PECTORIS: Chronic | ICD-10-CM

## 2019-09-17 DIAGNOSIS — N39.42 URINARY INCONTINENCE WITHOUT SENSORY AWARENESS: Chronic | ICD-10-CM

## 2019-09-17 DIAGNOSIS — E53.8 B12 DEFICIENCY: ICD-10-CM

## 2019-09-17 DIAGNOSIS — I87.2 VENOUS INSUFFICIENCY OF BOTH LOWER EXTREMITIES: ICD-10-CM

## 2019-09-17 DIAGNOSIS — K21.00 GASTROESOPHAGEAL REFLUX DISEASE WITH ESOPHAGITIS: Chronic | ICD-10-CM

## 2019-09-17 DIAGNOSIS — N30.01 ACUTE CYSTITIS WITH HEMATURIA: ICD-10-CM

## 2019-09-17 DIAGNOSIS — Z00.00 ROUTINE GENERAL MEDICAL EXAMINATION AT A HEALTH CARE FACILITY: Primary | ICD-10-CM

## 2019-09-17 PROCEDURE — G0438 PPPS, INITIAL VISIT: HCPCS | Performed by: INTERNAL MEDICINE

## 2019-09-17 PROCEDURE — 99215 OFFICE O/P EST HI 40 MIN: CPT | Performed by: INTERNAL MEDICINE

## 2019-09-17 ASSESSMENT — LIFESTYLE VARIABLES: HOW OFTEN DO YOU HAVE A DRINK CONTAINING ALCOHOL: 0

## 2019-09-17 ASSESSMENT — PATIENT HEALTH QUESTIONNAIRE - PHQ9
SUM OF ALL RESPONSES TO PHQ QUESTIONS 1-9: 0
SUM OF ALL RESPONSES TO PHQ QUESTIONS 1-9: 0

## 2019-09-17 NOTE — ASSESSMENT & PLAN NOTE
Has been using incontinence pads,  Patient is compliant w medications, no side effects, effective, provides adequate symptom relief. No new symptoms or problems as noted by patient. The problem is stable, no changes noted by patient. Will consider monitoring labs and refill medications as appropriate. Patient counseled and will continue current plan.

## 2019-09-17 NOTE — PROGRESS NOTES
patient. The problem is stable, no changes noted by patient. Will consider monitoring labs and refill medications as appropriate. Patient counseled and will continue current plan. Primary osteoarthritis involving multiple joints  On tyelnol,   Patient is compliant w medications, no side effects, effective, provides adequate symptom relief. No new symptoms or problems as noted by patient. The problem is stable, no changes noted by patient. Will consider monitoring labs and refill medications as appropriate. Patient counseled and will continue current plan. Urinary incontinence without sensory awareness  Has been using incontinence pads,  Patient is compliant w medications, no side effects, effective, provides adequate symptom relief. No new symptoms or problems as noted by patient. The problem is stable, no changes noted by patient. Will consider monitoring labs and refill medications as appropriate. Patient counseled and will continue current plan. Venous insufficiency of leg  Wears Elastic compression stockings. B12 deficiency  On b12,   Patient is compliant w medications, no side effects, effective, provides adequate symptom relief. No new symptoms or problems as noted by patient. The problem is stable, no changes noted by patient. Will consider monitoring labs and refill medications as appropriate. Patient counseled and will continue current plan. Balance problem  This continues,   Getting pt/ot.      Past Medical History:   Diagnosis Date    Arthritis     At risk for falling 06/26/2019    Atrial fibrillation ([address]     CAD (coronary artery disease)     Cancer ([address] 1999    prostate    Gastroesophageal reflux disease with esophagitis 7/23/2013    Hypertension     Sleep apnea 5/11/2015    Urinary incontinence without sensory awareness 7/1/2019       Past Surgical History:   Procedure Laterality Date    BLADDER SURGERY  12-    CATARACT REMOVAL  2012    both eyes    symptoms or problems as noted by patient. The problem is stable, no changes noted by patient. Will consider monitoring labs and refill medications as appropriate. Patient counseled and will continue current plan. Obstructive sleep apnea syndrome  Does not use bipap,  Patient is compliant w medications, no side effects, effective, provides adequate symptom relief. No new symptoms or problems as noted by patient. The problem is stable, no changes noted by patient. Will consider monitoring labs and refill medications as appropriate. Patient counseled and will continue current plan. Primary osteoarthritis involving multiple joints  On tyelnol,   Patient is compliant w medications, no side effects, effective, provides adequate symptom relief. No new symptoms or problems as noted by patient. The problem is stable, no changes noted by patient. Will consider monitoring labs and refill medications as appropriate. Patient counseled and will continue current plan. Urinary incontinence without sensory awareness  Has been using incontinence pads,  Patient is compliant w medications, no side effects, effective, provides adequate symptom relief. No new symptoms or problems as noted by patient. The problem is stable, no changes noted by patient. Will consider monitoring labs and refill medications as appropriate. Patient counseled and will continue current plan. Venous insufficiency of leg  Wears Elastic compression stockings. B12 deficiency  On b12,   Patient is compliant w medications, no side effects, effective, provides adequate symptom relief. No new symptoms or problems as noted by patient. The problem is stable, no changes noted by patient. Will consider monitoring labs and refill medications as appropriate. Patient counseled and will continue current plan. Balance problem  This continues,   Getting pt/ot. Plan: Will refill meds,   Suggest seeing the cardiologist regarding coumadin. Will get blood work,        Roula Jhaveri MD

## 2019-10-07 ENCOUNTER — CARE COORDINATION (OUTPATIENT)
Dept: CASE MANAGEMENT | Age: 84
End: 2019-10-07

## 2019-10-08 ENCOUNTER — TELEPHONE (OUTPATIENT)
Dept: PHARMACY | Facility: CLINIC | Age: 84
End: 2019-10-08

## 2019-10-29 ENCOUNTER — OFFICE VISIT (OUTPATIENT)
Dept: PRIMARY CARE CLINIC | Age: 84
End: 2019-10-29
Payer: MEDICARE

## 2019-10-29 VITALS
DIASTOLIC BLOOD PRESSURE: 72 MMHG | WEIGHT: 188 LBS | HEART RATE: 58 BPM | HEIGHT: 72 IN | SYSTOLIC BLOOD PRESSURE: 120 MMHG | BODY MASS INDEX: 25.47 KG/M2 | OXYGEN SATURATION: 98 %

## 2019-10-29 DIAGNOSIS — I10 ESSENTIAL HYPERTENSION: Chronic | ICD-10-CM

## 2019-10-29 DIAGNOSIS — F01.518 VASCULAR DEMENTIA WITH BEHAVIOR DISTURBANCE: Chronic | ICD-10-CM

## 2019-10-29 DIAGNOSIS — K21.00 GASTROESOPHAGEAL REFLUX DISEASE WITH ESOPHAGITIS: Chronic | ICD-10-CM

## 2019-10-29 DIAGNOSIS — I25.10 CORONARY ARTERY DISEASE INVOLVING NATIVE CORONARY ARTERY OF NATIVE HEART WITHOUT ANGINA PECTORIS: Chronic | ICD-10-CM

## 2019-10-29 DIAGNOSIS — M15.9 PRIMARY OSTEOARTHRITIS INVOLVING MULTIPLE JOINTS: ICD-10-CM

## 2019-10-29 LAB
A/G RATIO: 1.4 (ref 1.1–2.2)
ALBUMIN SERPL-MCNC: 4 G/DL (ref 3.4–5)
ALP BLD-CCNC: 75 U/L (ref 40–129)
ALT SERPL-CCNC: 19 U/L (ref 10–40)
ANION GAP SERPL CALCULATED.3IONS-SCNC: 15 MMOL/L (ref 3–16)
AST SERPL-CCNC: 20 U/L (ref 15–37)
BASOPHILS ABSOLUTE: 0 K/UL (ref 0–0.2)
BASOPHILS RELATIVE PERCENT: 0.6 %
BILIRUB SERPL-MCNC: 0.7 MG/DL (ref 0–1)
BUN BLDV-MCNC: 29 MG/DL (ref 7–20)
CALCIUM SERPL-MCNC: 9.6 MG/DL (ref 8.3–10.6)
CHLORIDE BLD-SCNC: 103 MMOL/L (ref 99–110)
CHOLESTEROL, TOTAL: 115 MG/DL (ref 0–199)
CO2: 28 MMOL/L (ref 21–32)
CREAT SERPL-MCNC: 1.2 MG/DL (ref 0.8–1.3)
EOSINOPHILS ABSOLUTE: 0.2 K/UL (ref 0–0.6)
EOSINOPHILS RELATIVE PERCENT: 2.4 %
GFR AFRICAN AMERICAN: >60
GFR NON-AFRICAN AMERICAN: 57
GLOBULIN: 2.9 G/DL
GLUCOSE BLD-MCNC: 117 MG/DL (ref 70–99)
HCT VFR BLD CALC: 39.8 % (ref 40.5–52.5)
HDLC SERPL-MCNC: 40 MG/DL (ref 40–60)
HEMOGLOBIN: 13.4 G/DL (ref 13.5–17.5)
LDL CHOLESTEROL CALCULATED: 47 MG/DL
LYMPHOCYTES ABSOLUTE: 2.3 K/UL (ref 1–5.1)
LYMPHOCYTES RELATIVE PERCENT: 34.9 %
MCH RBC QN AUTO: 30.9 PG (ref 26–34)
MCHC RBC AUTO-ENTMCNC: 33.6 G/DL (ref 31–36)
MCV RBC AUTO: 92 FL (ref 80–100)
MONOCYTES ABSOLUTE: 0.7 K/UL (ref 0–1.3)
MONOCYTES RELATIVE PERCENT: 10.8 %
NEUTROPHILS ABSOLUTE: 3.4 K/UL (ref 1.7–7.7)
NEUTROPHILS RELATIVE PERCENT: 51.3 %
PDW BLD-RTO: 14.9 % (ref 12.4–15.4)
PLATELET # BLD: 271 K/UL (ref 135–450)
PMV BLD AUTO: 8.3 FL (ref 5–10.5)
POTASSIUM SERPL-SCNC: 3.8 MMOL/L (ref 3.5–5.1)
RBC # BLD: 4.32 M/UL (ref 4.2–5.9)
SODIUM BLD-SCNC: 146 MMOL/L (ref 136–145)
TOTAL PROTEIN: 6.9 G/DL (ref 6.4–8.2)
TRIGL SERPL-MCNC: 139 MG/DL (ref 0–150)
TSH SERPL DL<=0.05 MIU/L-ACNC: 1.55 UIU/ML (ref 0.27–4.2)
VLDLC SERPL CALC-MCNC: 28 MG/DL
WBC # BLD: 6.6 K/UL (ref 4–11)

## 2019-10-29 PROCEDURE — 99214 OFFICE O/P EST MOD 30 MIN: CPT | Performed by: INTERNAL MEDICINE

## 2019-10-29 RX ORDER — AMLODIPINE BESYLATE 10 MG/1
10 TABLET ORAL DAILY
Qty: 30 TABLET | Refills: 3 | Status: SHIPPED | OUTPATIENT
Start: 2019-10-29 | End: 2019-12-02 | Stop reason: SDUPTHER

## 2019-10-29 RX ORDER — AMLODIPINE BESYLATE 5 MG/1
10 TABLET ORAL
COMMUNITY
Start: 2019-10-05 | End: 2019-10-29 | Stop reason: SDUPTHER

## 2019-10-29 RX ORDER — FUROSEMIDE 20 MG/1
20 TABLET ORAL DAILY
Qty: 30 TABLET | Refills: 5
Start: 2019-10-29

## 2019-10-29 RX ORDER — TRAZODONE HYDROCHLORIDE 50 MG/1
50 TABLET ORAL NIGHTLY
Qty: 30 TABLET | Refills: 5 | Status: SHIPPED | OUTPATIENT
Start: 2019-10-29

## 2019-10-29 RX ORDER — ASPIRIN 81 MG/1
81 TABLET ORAL DAILY
COMMUNITY

## 2019-10-30 ENCOUNTER — TELEPHONE (OUTPATIENT)
Dept: PRIMARY CARE CLINIC | Age: 84
End: 2019-10-30

## 2019-12-02 ENCOUNTER — OFFICE VISIT (OUTPATIENT)
Dept: PRIMARY CARE CLINIC | Age: 84
End: 2019-12-02
Payer: MEDICARE

## 2019-12-02 VITALS
WEIGHT: 189.2 LBS | OXYGEN SATURATION: 96 % | HEIGHT: 72 IN | HEART RATE: 72 BPM | BODY MASS INDEX: 25.63 KG/M2 | DIASTOLIC BLOOD PRESSURE: 86 MMHG | SYSTOLIC BLOOD PRESSURE: 158 MMHG

## 2019-12-02 DIAGNOSIS — Z12.5 SCREENING FOR PROSTATE CANCER: ICD-10-CM

## 2019-12-02 DIAGNOSIS — I10 ESSENTIAL HYPERTENSION: Chronic | ICD-10-CM

## 2019-12-02 DIAGNOSIS — C61 PROSTATE CANCER (HCC): ICD-10-CM

## 2019-12-02 DIAGNOSIS — I10 ESSENTIAL HYPERTENSION: Primary | Chronic | ICD-10-CM

## 2019-12-02 DIAGNOSIS — F01.518 VASCULAR DEMENTIA WITH BEHAVIOR DISTURBANCE: Chronic | ICD-10-CM

## 2019-12-02 DIAGNOSIS — R73.9 HYPERGLYCEMIA: ICD-10-CM

## 2019-12-02 DIAGNOSIS — K21.00 GASTROESOPHAGEAL REFLUX DISEASE WITH ESOPHAGITIS: Chronic | ICD-10-CM

## 2019-12-02 DIAGNOSIS — I25.10 CORONARY ARTERY DISEASE INVOLVING NATIVE CORONARY ARTERY OF NATIVE HEART WITHOUT ANGINA PECTORIS: Chronic | ICD-10-CM

## 2019-12-02 DIAGNOSIS — E53.8 B12 DEFICIENCY: ICD-10-CM

## 2019-12-02 DIAGNOSIS — R60.9 EDEMA, UNSPECIFIED TYPE: ICD-10-CM

## 2019-12-02 DIAGNOSIS — M15.9 PRIMARY OSTEOARTHRITIS INVOLVING MULTIPLE JOINTS: ICD-10-CM

## 2019-12-02 DIAGNOSIS — R31.9 HEMATURIA, UNSPECIFIED TYPE: ICD-10-CM

## 2019-12-02 LAB
A/G RATIO: 1.3 (ref 1.1–2.2)
ALBUMIN SERPL-MCNC: 4 G/DL (ref 3.4–5)
ALP BLD-CCNC: 73 U/L (ref 40–129)
ALT SERPL-CCNC: 13 U/L (ref 10–40)
ANION GAP SERPL CALCULATED.3IONS-SCNC: 15 MMOL/L (ref 3–16)
AST SERPL-CCNC: 17 U/L (ref 15–37)
BASOPHILS ABSOLUTE: 0 K/UL (ref 0–0.2)
BASOPHILS RELATIVE PERCENT: 0.7 %
BILIRUB SERPL-MCNC: 0.8 MG/DL (ref 0–1)
BILIRUBIN, POC: ABNORMAL
BLOOD URINE, POC: ABNORMAL
BUN BLDV-MCNC: 32 MG/DL (ref 7–20)
CALCIUM SERPL-MCNC: 10.1 MG/DL (ref 8.3–10.6)
CHLORIDE BLD-SCNC: 104 MMOL/L (ref 99–110)
CHOLESTEROL, TOTAL: 161 MG/DL (ref 0–199)
CLARITY, POC: ABNORMAL
CO2: 27 MMOL/L (ref 21–32)
COLOR, POC: YELLOW
CREAT SERPL-MCNC: 1.4 MG/DL (ref 0.8–1.3)
EOSINOPHILS ABSOLUTE: 0.2 K/UL (ref 0–0.6)
EOSINOPHILS RELATIVE PERCENT: 2.4 %
GFR AFRICAN AMERICAN: 58
GFR NON-AFRICAN AMERICAN: 48
GLOBULIN: 3.2 G/DL
GLUCOSE BLD-MCNC: 117 MG/DL (ref 70–99)
GLUCOSE URINE, POC: ABNORMAL
HCT VFR BLD CALC: 41.2 % (ref 40.5–52.5)
HDLC SERPL-MCNC: 45 MG/DL (ref 40–60)
HEMOGLOBIN: 14 G/DL (ref 13.5–17.5)
KETONES, POC: ABNORMAL
LDL CHOLESTEROL CALCULATED: 85 MG/DL
LEUKOCYTE EST, POC: ABNORMAL
LYMPHOCYTES ABSOLUTE: 2.4 K/UL (ref 1–5.1)
LYMPHOCYTES RELATIVE PERCENT: 36.2 %
MCH RBC QN AUTO: 31.6 PG (ref 26–34)
MCHC RBC AUTO-ENTMCNC: 33.9 G/DL (ref 31–36)
MCV RBC AUTO: 93.2 FL (ref 80–100)
MONOCYTES ABSOLUTE: 0.7 K/UL (ref 0–1.3)
MONOCYTES RELATIVE PERCENT: 10.6 %
NEUTROPHILS ABSOLUTE: 3.3 K/UL (ref 1.7–7.7)
NEUTROPHILS RELATIVE PERCENT: 50.1 %
NITRITE, POC: ABNORMAL
PDW BLD-RTO: 15.1 % (ref 12.4–15.4)
PH, POC: 5.5
PLATELET # BLD: 280 K/UL (ref 135–450)
PMV BLD AUTO: 8.8 FL (ref 5–10.5)
POTASSIUM SERPL-SCNC: 4.1 MMOL/L (ref 3.5–5.1)
PROSTATE SPECIFIC ANTIGEN: <0.01 NG/ML (ref 0–4)
PROTEIN, POC: 30
RBC # BLD: 4.42 M/UL (ref 4.2–5.9)
SODIUM BLD-SCNC: 146 MMOL/L (ref 136–145)
SPECIFIC GRAVITY, POC: 1.03
TOTAL PROTEIN: 7.2 G/DL (ref 6.4–8.2)
TRIGL SERPL-MCNC: 153 MG/DL (ref 0–150)
TSH SERPL DL<=0.05 MIU/L-ACNC: 1.35 UIU/ML (ref 0.27–4.2)
UROBILINOGEN, POC: 0.2
VLDLC SERPL CALC-MCNC: 31 MG/DL
WBC # BLD: 6.6 K/UL (ref 4–11)

## 2019-12-02 PROCEDURE — 99215 OFFICE O/P EST HI 40 MIN: CPT | Performed by: INTERNAL MEDICINE

## 2019-12-02 PROCEDURE — 81002 URINALYSIS NONAUTO W/O SCOPE: CPT | Performed by: INTERNAL MEDICINE

## 2019-12-02 RX ORDER — MELATONIN 10 MG
CAPSULE ORAL
COMMUNITY

## 2019-12-02 RX ORDER — ESOMEPRAZOLE MAGNESIUM 40 MG/1
40 FOR SUSPENSION ORAL DAILY
Qty: 30 PACKET | Refills: 5 | Status: SHIPPED | OUTPATIENT
Start: 2019-12-02

## 2019-12-02 RX ORDER — AMLODIPINE BESYLATE 10 MG/1
10 TABLET ORAL DAILY
Qty: 90 TABLET | Refills: 3 | Status: SHIPPED | OUTPATIENT
Start: 2019-12-02

## 2019-12-02 RX ORDER — SULFAMETHOXAZOLE AND TRIMETHOPRIM 200; 40 MG/5ML; MG/5ML
SUSPENSION ORAL
Qty: 200 ML | Refills: 0 | Status: SHIPPED | OUTPATIENT
Start: 2019-12-02

## 2019-12-02 RX ORDER — POTASSIUM CHLORIDE 20MEQ/15ML
LIQUID (ML) ORAL
Refills: 3 | COMMUNITY
Start: 2019-09-24

## 2019-12-02 ASSESSMENT — PATIENT HEALTH QUESTIONNAIRE - PHQ9
SUM OF ALL RESPONSES TO PHQ9 QUESTIONS 1 & 2: 0
SUM OF ALL RESPONSES TO PHQ QUESTIONS 1-9: 0
2. FEELING DOWN, DEPRESSED OR HOPELESS: 0
SUM OF ALL RESPONSES TO PHQ QUESTIONS 1-9: 0
1. LITTLE INTEREST OR PLEASURE IN DOING THINGS: 0

## 2019-12-03 LAB
ESTIMATED AVERAGE GLUCOSE: 128.4 MG/DL
HBA1C MFR BLD: 6.1 %

## 2019-12-05 LAB
ORGANISM: ABNORMAL
URINE CULTURE, ROUTINE: ABNORMAL

## 2020-01-02 ENCOUNTER — CARE COORDINATION (OUTPATIENT)
Dept: CASE MANAGEMENT | Age: 85
End: 2020-01-02

## 2020-01-28 ENCOUNTER — TELEPHONE (OUTPATIENT)
Dept: PHARMACY | Facility: CLINIC | Age: 85
End: 2020-01-28

## 2020-04-22 ENCOUNTER — TELEPHONE (OUTPATIENT)
Dept: PRIMARY CARE CLINIC | Age: 85
End: 2020-04-22